# Patient Record
Sex: MALE | Race: OTHER | Employment: UNEMPLOYED | URBAN - METROPOLITAN AREA
[De-identification: names, ages, dates, MRNs, and addresses within clinical notes are randomized per-mention and may not be internally consistent; named-entity substitution may affect disease eponyms.]

---

## 2019-04-19 ENCOUNTER — OFFICE VISIT (OUTPATIENT)
Dept: FAMILY MEDICINE CLINIC | Age: 44
End: 2019-04-19

## 2019-04-19 VITALS
SYSTOLIC BLOOD PRESSURE: 124 MMHG | RESPIRATION RATE: 16 BRPM | DIASTOLIC BLOOD PRESSURE: 84 MMHG | TEMPERATURE: 97.8 F | HEART RATE: 100 BPM | WEIGHT: 263 LBS | OXYGEN SATURATION: 94 % | BODY MASS INDEX: 39.86 KG/M2 | HEIGHT: 68 IN

## 2019-04-19 DIAGNOSIS — Z00.00 PHYSICAL EXAM, ANNUAL: Primary | ICD-10-CM

## 2019-04-19 DIAGNOSIS — I10 ESSENTIAL HYPERTENSION: ICD-10-CM

## 2019-04-19 DIAGNOSIS — S99.921S INJURY OF RIGHT FOOT, SEQUELA: ICD-10-CM

## 2019-04-19 DIAGNOSIS — S89.91XS INJURY OF RIGHT LOWER EXTREMITY, SEQUELA: ICD-10-CM

## 2019-04-19 DIAGNOSIS — G89.21 CHRONIC PAIN DUE TO TRAUMA: ICD-10-CM

## 2019-04-19 DIAGNOSIS — E66.01 SEVERE OBESITY (HCC): ICD-10-CM

## 2019-04-19 RX ORDER — CHLORTHALIDONE 25 MG/1
25 TABLET ORAL DAILY
Qty: 30 TAB | Refills: 2 | Status: SHIPPED | OUTPATIENT
Start: 2019-04-19 | End: 2019-07-11 | Stop reason: SDUPTHER

## 2019-04-19 RX ORDER — DULOXETIN HYDROCHLORIDE 30 MG/1
30 CAPSULE, DELAYED RELEASE ORAL DAILY
Qty: 60 CAP | Refills: 0 | Status: SHIPPED | OUTPATIENT
Start: 2019-04-19 | End: 2019-05-17

## 2019-04-19 RX ORDER — CHLORTHALIDONE 25 MG/1
TABLET ORAL DAILY
COMMUNITY
End: 2019-04-19 | Stop reason: SDUPTHER

## 2019-04-19 NOTE — PROGRESS NOTES
1. Have you been to the ER, urgent care clinic since your last visit? Hospitalized since your last visit? No 
 
2. Have you seen or consulted any other health care providers outside of the [address] [place] [address] since your last visit? Include any pap smears or colon screening. No  
 
Chief Complaint Patient presents with [facility] Establish Care Visit Vitals /84 (BP 1 Location: Left arm, BP Patient Position: At rest) Pulse 100 Temp 97.8 °F (36.6 °C) Resp 16 Ht 5' 8\" (1.727 m) Wt 263 lb (119.3 kg) SpO2 94% BMI 39.99 kg/m²
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
## 2019-05-09 ENCOUNTER — OFFICE VISIT (OUTPATIENT)
Dept: ORTHOPEDIC SURGERY | Facility: CLINIC | Age: 44
End: 2019-05-09

## 2019-05-09 VITALS
SYSTOLIC BLOOD PRESSURE: 122 MMHG | OXYGEN SATURATION: 97 % | TEMPERATURE: 98.5 F | BODY MASS INDEX: 39.5 KG/M2 | HEART RATE: 84 BPM | HEIGHT: 68 IN | WEIGHT: 260.6 LBS | RESPIRATION RATE: 16 BRPM | DIASTOLIC BLOOD PRESSURE: 88 MMHG

## 2019-05-09 DIAGNOSIS — M25.561 CHRONIC PAIN OF RIGHT KNEE: ICD-10-CM

## 2019-05-09 DIAGNOSIS — Q66.52 CONGENITAL PES PLANUS, LEFT FOOT: ICD-10-CM

## 2019-05-09 DIAGNOSIS — G89.29 CHRONIC PAIN OF RIGHT KNEE: ICD-10-CM

## 2019-05-09 DIAGNOSIS — M17.31 POST-TRAUMATIC OSTEOARTHRITIS OF RIGHT KNEE: Primary | ICD-10-CM

## 2019-05-09 RX ORDER — BETAMETHASONE SODIUM PHOSPHATE AND BETAMETHASONE ACETATE 3; 3 MG/ML; MG/ML
6 INJECTION, SUSPENSION INTRA-ARTICULAR; INTRALESIONAL; INTRAMUSCULAR; SOFT TISSUE ONCE
Qty: 0.5 ML | Refills: 0
Start: 2019-05-09 | End: 2019-05-09
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
## 2019-05-09 NOTE — LETTER
5/9/2019 9:01 AM 
 
Mr. Stefania Villalpando [address] [phone] Full work restrictions for injuries to the Thigh, Knee, Leg PATIENT'S NAME: Stefania Villalpando   DATE: 5/9/2019 LIFTING:   The patient can lift no more than 20 pounds. CLIMBING:   The patient can climb no ladders or stairs WALKING/STANDING The patient can walk or stand no more than 1 hour at a time. The patient cannot walk on uneven ground or on scaffolding. KNEELING/SQUATTING The patient cannot kneel or squat These restrictions are in effect for the above named patient From: 5/9/2019  TO:PERMANENT Sincerely, 
 
 
 
Bernie Sahu MD

## 2019-05-15 ENCOUNTER — OFFICE VISIT (OUTPATIENT)
Dept: ORTHOPEDIC SURGERY | Age: 44
End: 2019-05-15

## 2019-05-15 VITALS
RESPIRATION RATE: 16 BRPM | TEMPERATURE: 97.6 F | OXYGEN SATURATION: 97 % | SYSTOLIC BLOOD PRESSURE: 139 MMHG | HEIGHT: 68 IN | HEART RATE: 77 BPM | DIASTOLIC BLOOD PRESSURE: 92 MMHG | WEIGHT: 259 LBS | BODY MASS INDEX: 39.25 KG/M2

## 2019-05-15 DIAGNOSIS — Q66.6 PES PLANOVALGUS: ICD-10-CM

## 2019-05-15 DIAGNOSIS — G89.29 CHRONIC PAIN OF LEFT ANKLE: ICD-10-CM

## 2019-05-15 DIAGNOSIS — M25.572 CHRONIC PAIN OF LEFT ANKLE: ICD-10-CM

## 2019-05-15 DIAGNOSIS — M19.072 PRIMARY OSTEOARTHRITIS OF LEFT FOOT: ICD-10-CM

## 2019-05-15 DIAGNOSIS — M79.672 LEFT FOOT PAIN: Primary | ICD-10-CM

## 2019-05-15 RX ORDER — DICLOFENAC SODIUM 10 MG/G
GEL TOPICAL 4 TIMES DAILY
COMMUNITY
End: 2020-01-27

## 2019-05-15 RX ORDER — DICLOFENAC SODIUM 75 MG/1
75 TABLET, DELAYED RELEASE ORAL 2 TIMES DAILY WITH MEALS
Qty: 60 TAB | Refills: 0 | Status: SHIPPED | OUTPATIENT
Start: 2019-05-15 | End: 2020-01-27

## 2019-05-15 RX ORDER — FAMOTIDINE 40 MG/1
40 TABLET, FILM COATED ORAL DAILY
Qty: 30 TAB | Refills: 0 | Status: SHIPPED | OUTPATIENT
Start: 2019-05-15 | End: 2020-01-27

## 2019-05-15 NOTE — PATIENT INSTRUCTIONS
An MRI or CT has been ordered for you. A [employer] will be contacting you to schedule the appointment as soon as it has been approved with your insurance company. Please schedule an appointment to follow up with the doctor or the physicians assistant after the MRI or CT has been conducted. Flatfoot: Care Instructions Your Care Instructions A flatfoot means that the bottom of your foot does not have the usual arch. One or both of your feet may be flat. You may have inherited flatfeet. Having an injury, being very overweight, or having a condition such as rheumatoid arthritis, stroke, or diabetes also can cause the arch to flatten. Flatfoot usually is not a serious problem. But some people do have pain if they gain weight or stand a lot. You also can have pain when walking or running. You can do exercises and wear pads and roomy shoes to help support your feet. Follow-up care is a key part of your treatment and safety. Be sure to make and go to all appointments, and call your doctor if you are having problems. It's also a good idea to know your test results and keep a list of the medicines you take. How can you care for yourself at home? · Wear shoes with good arch support and lots of room in the toes. · Put heel padding (called a heel cup) or inserts (called orthotics) in your shoes to raise the heel. Orthotics are molded pieces of rubber, leather, or other material that can help cushion and balance your feet. · Try these exercises to stretch your feet and make them stronger, if your doctor says it is okay. ? Stretch your calf muscles: Stand about 1 foot from a wall and place the palms of both hands against the wall at chest level. Step back with one foot. Keep that leg straight at the knee, and keep both feet flat on the floor. Your feet should point at the wall or slightly toward the center of your body.  Bend your front leg at the knee, and press the wall with both hands until you feel a gentle stretch in your back leg. Hold for at least 15 seconds. Increase to 30 seconds over time. Switch legs and repeat. Do this 2 to 4 times a day. ? Stretch your feet: Sit on the floor or a mat with your feet stretched in front of you. Roll up a towel lengthwise and loop it around the ball of one foot. Hold one end of the towel in each hand and gently pull the towel toward your body. Hold for at least 15 to 30 seconds. Repeat with the other foot. Do this 2 to 4 times a day. ? Make your feet stronger: Place a towel on the floor. Sit in a chair in front of the towel with both feet flat on the towel at one end.  the towel with the toes of one foot while keeping the heel of that foot on the floor. (Use your other foot to anchor the towel). Curl your toes to pull the towel toward you. Repeat with the other foot. Do this 2 to 4 times a day. · Take anti-inflammatory medicines such as ibuprofen (Advil, Motrin) and naproxen (Aleve) to reduce pain if your feet or legs hurt. Read and follow all instructions on the label. · Try heat or massage on the area that is causing you pain. Use a heating pad set on low or a warm cloth. Put a thin cloth between the heating pad and your skin. When should you call for help? Watch closely for changes in your health, and be sure to contact your doctor if: 
  · You have pain in your feet or legs.  
  · You want help to find orthotics to fit your feet. Where can you learn more? Go to http://marilin-desirae.info/. Enter P227 in the search box to learn more about \"Flatfoot: Care Instructions. \" Current as of: September 20, 2018 Content Version: 11.9 © 8942-2800 [employer]. Care instructions adapted under license by [employer] (which disclaims liability or warranty for this information).  If you have questions about a medical condition or this instruction, always ask your healthcare professional. [employer] disclaims any warranty or liability for your use of this information.

## 2019-05-15 NOTE — PROGRESS NOTES
1. Have you been to the ER, urgent care clinic since your last visit? Hospitalized since your last visit? NO 
 
2. Have you seen or consulted any other health care providers outside of the [address] [place] [address] since your last visit? Include any pap smears or colon screening. Yes, Dr. Chilo Izquierdo from in [facility]

## 2019-05-15 NOTE — LETTER
NOTIFICATION RETURN TO WORK / SCHOOL 
 
5/15/2019 9:11 AM 
 
Mr. Yulissa Dolan [address] [place] [id number] To Whom It May Concern: 
 
[facility]ul[facility] D[facility] Mr. Hugo Castrejon is under my care for severe pain from symptomatic pes planovalgus foot deformity. He cannot stand, walk, bend, lift, or twist until further notice. He is recommended to work a 100% sedentary position, if available. If unavailable, he needs to remain out of work until further notice. If there are questions or concerns please have the patient contact our office.  
 
 
 
Sincerely, 
 
 
Chino Velasco MD
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
## 2019-05-16 ENCOUNTER — TELEPHONE (OUTPATIENT)
Dept: ORTHOPEDIC SURGERY | Age: 44
End: 2019-05-16

## 2019-05-16 NOTE — TELEPHONE ENCOUNTER
There is a Voltaren tablet, gel capsules, or topical gel but there is no oral suspension available. We can prescribe topical Voltaren gel of the patient cannot swallow the tablet form. The Pepcid was rx as protection for the stomach if patient was going to us the oral Voltaren.

## 2019-05-16 NOTE — TELEPHONE ENCOUNTER
Patient called in states that [facility] does not have his medication from yesterday for the diclofenac EC (VOLTAREN) 75 mg EC tablet   They only have received the rx for famotidine (PEPCID) 40 mg tablet   Patient needs rx resent and he also does not understand why he was given the Pepcid rx.  Please advise patient at [phone]

## 2019-05-16 NOTE — TELEPHONE ENCOUNTER
Patient called in states that both prescriptions were sent over to the pharmacy in a pill form but the doctor told him they were sending both in a liquid form that he swallows. He can be reached at [phone].

## 2019-05-17 ENCOUNTER — OFFICE VISIT (OUTPATIENT)
Dept: FAMILY MEDICINE CLINIC | Age: 44
End: 2019-05-17

## 2019-05-17 VITALS
OXYGEN SATURATION: 98 % | RESPIRATION RATE: 16 BRPM | HEART RATE: 106 BPM | DIASTOLIC BLOOD PRESSURE: 91 MMHG | WEIGHT: 262.2 LBS | TEMPERATURE: 96.9 F | HEIGHT: 68 IN | BODY MASS INDEX: 39.74 KG/M2 | SYSTOLIC BLOOD PRESSURE: 142 MMHG

## 2019-05-17 DIAGNOSIS — M54.50 CHRONIC MIDLINE LOW BACK PAIN WITHOUT SCIATICA: ICD-10-CM

## 2019-05-17 DIAGNOSIS — Q66.6 CONGENITAL PES PLANOVALGUS: ICD-10-CM

## 2019-05-17 DIAGNOSIS — G89.29 CHRONIC MIDLINE LOW BACK PAIN WITHOUT SCIATICA: ICD-10-CM

## 2019-05-17 DIAGNOSIS — S89.91XS INJURY OF RIGHT LOWER EXTREMITY, SEQUELA: ICD-10-CM

## 2019-05-17 DIAGNOSIS — G89.29 CHRONIC LEFT SHOULDER PAIN: ICD-10-CM

## 2019-05-17 DIAGNOSIS — M25.512 CHRONIC LEFT SHOULDER PAIN: ICD-10-CM

## 2019-05-17 DIAGNOSIS — E66.01 SEVERE OBESITY (HCC): Primary | ICD-10-CM

## 2019-05-17 NOTE — PROGRESS NOTES
1. Have you been to the ER, urgent care clinic since your last visit? Hospitalized since your last visit? No    2. Have you seen or consulted any other health care providers outside of the [address] [place] [address] since your last visit? Include any pap smears or colon screening.  No       Chief Complaint   Patient presents with    LOW BACK PAIN    Medication Evaluation     Visit Vitals  BP (!) 142/91 (BP 1 Location: Right arm, BP Patient Position: At rest)   Pulse (!) 106   Temp 96.9 °F (36.1 °C)   Resp 16   Ht 5' 8\" (1.727 m)   Wt 262 lb 3.2 oz (118.9 kg)   SpO2 98%   BMI 39.87 kg/m²

## 2019-05-17 NOTE — PATIENT INSTRUCTIONS
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
## 2019-05-19 PROBLEM — Q66.6 CONGENITAL PES PLANOVALGUS: Status: ACTIVE | Noted: 2019-05-19

## 2019-05-19 PROBLEM — Z98.890 H/O RIGHT KNEE SURGERY: Status: ACTIVE | Noted: 2019-05-19

## 2019-05-19 PROBLEM — I10 HYPERTENSION: Status: ACTIVE | Noted: 2019-05-19
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
## 2019-06-06 ENCOUNTER — OFFICE VISIT (OUTPATIENT)
Dept: ORTHOPEDIC SURGERY | Facility: CLINIC | Age: 44
End: 2019-06-06

## 2019-06-06 VITALS
DIASTOLIC BLOOD PRESSURE: 96 MMHG | OXYGEN SATURATION: 98 % | BODY MASS INDEX: 39.98 KG/M2 | WEIGHT: 263.8 LBS | HEART RATE: 87 BPM | RESPIRATION RATE: 18 BRPM | HEIGHT: 68 IN | SYSTOLIC BLOOD PRESSURE: 134 MMHG | TEMPERATURE: 96.7 F

## 2019-06-06 DIAGNOSIS — M25.512 CHRONIC LEFT SHOULDER PAIN: ICD-10-CM

## 2019-06-06 DIAGNOSIS — M25.561 CHRONIC PAIN OF RIGHT KNEE: ICD-10-CM

## 2019-06-06 DIAGNOSIS — G89.29 CHRONIC PAIN OF RIGHT KNEE: ICD-10-CM

## 2019-06-06 DIAGNOSIS — M17.31 POST-TRAUMATIC OSTEOARTHRITIS OF RIGHT KNEE: Primary | ICD-10-CM

## 2019-06-06 DIAGNOSIS — S43.402A SPRAIN OF LEFT SHOULDER, UNSPECIFIED SHOULDER SPRAIN TYPE, INITIAL ENCOUNTER: ICD-10-CM

## 2019-06-06 DIAGNOSIS — M54.50 LUMBAR PAIN: ICD-10-CM

## 2019-06-06 DIAGNOSIS — G89.29 CHRONIC LEFT SHOULDER PAIN: ICD-10-CM

## 2019-06-06 DIAGNOSIS — M75.52 ACUTE BURSITIS OF LEFT SHOULDER: ICD-10-CM

## 2019-06-06 RX ORDER — BETAMETHASONE SODIUM PHOSPHATE AND BETAMETHASONE ACETATE 3; 3 MG/ML; MG/ML
6 INJECTION, SUSPENSION INTRA-ARTICULAR; INTRALESIONAL; INTRAMUSCULAR; SOFT TISSUE ONCE
Qty: 0.5 ML | Refills: 0
Start: 2019-06-06 | End: 2019-06-06

## 2019-06-06 RX ORDER — HYALURONATE SODIUM 10 MG/ML
2 SYRINGE (ML) INTRAARTICULAR ONCE
Qty: 2 ML | Refills: 0
Start: 2019-06-06 | End: 2019-06-06
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
## 2019-06-07 ENCOUNTER — DOCUMENTATION ONLY (OUTPATIENT)
Dept: ORTHOPEDIC SURGERY | Facility: CLINIC | Age: 44
End: 2019-06-07

## 2019-06-07 NOTE — PROGRESS NOTES
Patient dropped off Physician's Certificate of Health to be competed . Patient can be reached at [phone] when ready for pick-up.

## 2019-06-12 ENCOUNTER — APPOINTMENT (OUTPATIENT)
Dept: CT IMAGING | Age: 44
End: 2019-06-12
Attending: ORTHOPAEDIC SURGERY
Payer: MEDICAID

## 2019-06-12 ENCOUNTER — HOSPITAL ENCOUNTER (OUTPATIENT)
Dept: CT IMAGING | Age: 44
Discharge: HOME | End: 2019-06-12
Attending: ORTHOPAEDIC SURGERY
Payer: MEDICAID

## 2019-06-12 DIAGNOSIS — M79.672 LEFT FOOT PAIN: ICD-10-CM

## 2019-06-12 PROCEDURE — 73700 CT LOWER EXTREMITY W/O DYE: CPT

## 2019-06-13 ENCOUNTER — OFFICE VISIT (OUTPATIENT)
Dept: ORTHOPEDIC SURGERY | Facility: CLINIC | Age: 44
End: 2019-06-13

## 2019-06-13 ENCOUNTER — DOCUMENTATION ONLY (OUTPATIENT)
Dept: ORTHOPEDIC SURGERY | Facility: CLINIC | Age: 44
End: 2019-06-13

## 2019-06-13 VITALS
HEART RATE: 96 BPM | TEMPERATURE: 96.3 F | RESPIRATION RATE: 19 BRPM | WEIGHT: 260.6 LBS | BODY MASS INDEX: 39.5 KG/M2 | HEIGHT: 68 IN | SYSTOLIC BLOOD PRESSURE: 135 MMHG | OXYGEN SATURATION: 94 % | DIASTOLIC BLOOD PRESSURE: 94 MMHG

## 2019-06-13 DIAGNOSIS — M17.31 POST-TRAUMATIC OSTEOARTHRITIS OF RIGHT KNEE: Primary | ICD-10-CM

## 2019-06-13 DIAGNOSIS — M25.561 CHRONIC PAIN OF RIGHT KNEE: ICD-10-CM

## 2019-06-13 DIAGNOSIS — G89.29 CHRONIC PAIN OF RIGHT KNEE: ICD-10-CM

## 2019-06-13 RX ORDER — HYALURONATE SODIUM 10 MG/ML
2 SYRINGE (ML) INTRAARTICULAR ONCE
Qty: 2 ML | Refills: 0
Start: 2019-06-13 | End: 2019-06-13

## 2019-06-13 NOTE — PROGRESS NOTES
Patient: Margot Henderson                MRN: [id number]       SSN: [id number]  YOB: 1975        AGE: 53 y.o. SEX: male  Body mass index is 39.62 kg/m². PCP: Hemant Gaytan NP  06/13/19    Chief Complaint   Patient presents with    Knee Pain     right     HISTORY:  Margot Henderson is a 53 y.o. male who is seen for right knee pain. ICD-10-CM ICD-9-CM    1. Post-traumatic osteoarthritis of right knee M17.31 715.26 LA DRAIN/INJECT LARGE JOINT/BURSA      EUFLEXXA INJECTION PER DOSE      sodium hyaluronate (SUPARTZ FX/HYALGAN/GENIVSC) 10 mg/mL syrg injection   2. Chronic pain of right knee M25.561 719.46 LA DRAIN/INJECT LARGE JOINT/BURSA    G89.29 338.29 EUFLEXXA INJECTION PER DOSE      sodium hyaluronate (SUPARTZ FX/HYALGAN/GENIVSC) 10 mg/mL syrg injection     PROCEDURE:  After discussing treatment options, Mr. Troncoso's right knee was injected with 2 cc of Euflexxa. Chart reviewed for the following:   Audrey Resendiz MD, have reviewed the History, Physical and updated the Allergic reactions for [address] [place] [address] Drive performed immediately prior to start of procedure:  Audrey Resendiz MD, have performed the following reviews on Margot Henderson prior to the start of the procedure:            * Patient was identified by name and date of birth   * Agreement on procedure being performed was verified  * Risks and Benefits explained to the patient  * Procedure site verified and marked as necessary  * Patient was positioned for comfort  * Consent was obtained     Time: 8:40 AM     Date of procedure: 6/13/2019    Procedure performed by:  Walt Ortega MD    Mr. Troncoso tolerated the procedure well with no complications. PLAN:  After discussing treatment options, patient's right knee was injected with 2 cc of Euflexxa. Mr. Shashank Ko will follow up in one week to continue his visco supplementation injection series.       Scribed by Cassius Barber ([address]) as dictated by Jerson Childers MD

## 2019-06-13 NOTE — PROGRESS NOTES
1. Have you been to the ER, urgent care clinic since your last visit? Hospitalized since your last visit? No    2. Have you seen or consulted any other health care providers outside of the [address] [place] [address] since your last visit? Include any pap smears or colon screening.  No

## 2019-06-21 ENCOUNTER — OFFICE VISIT (OUTPATIENT)
Dept: ORTHOPEDIC SURGERY | Facility: CLINIC | Age: 44
End: 2019-06-21

## 2019-06-21 VITALS
DIASTOLIC BLOOD PRESSURE: 97 MMHG | TEMPERATURE: 97.6 F | BODY MASS INDEX: 40.13 KG/M2 | HEART RATE: 91 BPM | RESPIRATION RATE: 18 BRPM | WEIGHT: 264.8 LBS | SYSTOLIC BLOOD PRESSURE: 137 MMHG | OXYGEN SATURATION: 97 % | HEIGHT: 68 IN

## 2019-06-21 DIAGNOSIS — G89.29 CHRONIC PAIN OF RIGHT KNEE: ICD-10-CM

## 2019-06-21 DIAGNOSIS — M25.561 CHRONIC PAIN OF RIGHT KNEE: ICD-10-CM

## 2019-06-21 DIAGNOSIS — M17.31 POST-TRAUMATIC OSTEOARTHRITIS OF RIGHT KNEE: Primary | ICD-10-CM

## 2019-06-21 RX ORDER — HYALURONATE SODIUM 10 MG/ML
2 SYRINGE (ML) INTRAARTICULAR ONCE
Qty: 2 ML | Refills: 0
Start: 2019-06-21 | End: 2019-06-21

## 2019-06-21 NOTE — PROGRESS NOTES
Patient: Gera Alvarez                MRN: [id number]       SSN: [id number]  YOB: 1975        AGE: 53 y.o. SEX: male  Body mass index is 40.26 kg/m². PCP: Marquita Jeans, NP  06/21/19    Chief Complaint   Patient presents with    Knee Pain     Right     HISTORY:  Chantell Baum III is a 53 y.o. male who is seen for right knee pain. PROCEDURE:  After discussing treatment options, Mr. Troncoso's right knee was injected with 2 cc of Euflexxa. TIME OUT performed immediately prior to start of procedure:  Imani Pérez MD, have performed the following reviews on Chantell Baum III prior to the start of the procedure:            * Patient was identified by name and date of birth   * Agreement on procedure being performed was verified  * Risks and Benefits explained to the patient  * Procedure site verified and marked as necessary  * Patient was positioned for comfort  * Consent was obtained     Time: 11:05 AM     Date of procedure: 6/21/2019    Procedure performed by:  Emely Mckee MD    Mr. Troncoso tolerated the procedure well with no complications      [id number] ICD-9-CM    1. Post-traumatic osteoarthritis of right knee M17.31 715.26 SC DRAIN/INJECT LARGE JOINT/BURSA      EUFLEXXA INJECTION PER DOSE      sodium hyaluronate (SUPARTZ FX/HYALGAN/GENIVSC) 10 mg/mL syrg injection   2. Chronic pain of right knee M25.561 719.46 SC DRAIN/INJECT LARGE JOINT/BURSA    G89.29 338.29 EUFLEXXA INJECTION PER DOSE      sodium hyaluronate (SUPARTZ FX/HYALGAN/GENIVSC) 10 mg/mL syrg injection     PLAN:  After discussing treatment options, patient's right knee was injected with 2 cc of Euflexxa. Mr. Nadiya Cortez will follow up PRN now that he has completed his visco supplementation injection series.       Scribed by Brannon Bowman ([address]) as dictated by Emely Mckee

## 2019-06-26 ENCOUNTER — TELEPHONE (OUTPATIENT)
Dept: ORTHOPEDIC SURGERY | Age: 44
End: 2019-06-26

## 2019-06-26 NOTE — TELEPHONE ENCOUNTER
Patient was call to get result of his CT scan that was done on 6/12/19. He was not aware he needed a F/U appointment. Does he need to be seen today or wait until next week?     Please advise

## 2019-07-02 ENCOUNTER — OFFICE VISIT (OUTPATIENT)
Dept: FAMILY MEDICINE CLINIC | Age: 44
End: 2019-07-02

## 2019-07-02 VITALS
BODY MASS INDEX: 40.01 KG/M2 | SYSTOLIC BLOOD PRESSURE: 135 MMHG | WEIGHT: 264 LBS | HEIGHT: 68 IN | DIASTOLIC BLOOD PRESSURE: 93 MMHG | HEART RATE: 80 BPM | RESPIRATION RATE: 16 BRPM | TEMPERATURE: 97.8 F

## 2019-07-02 DIAGNOSIS — F10.10 ALCOHOL ABUSE: ICD-10-CM

## 2019-07-02 DIAGNOSIS — F34.1 DYSTHYMIA: ICD-10-CM

## 2019-07-02 DIAGNOSIS — I10 ESSENTIAL HYPERTENSION: Primary | ICD-10-CM

## 2019-07-02 DIAGNOSIS — E66.01 SEVERE OBESITY (HCC): ICD-10-CM

## 2019-07-02 RX ORDER — AMLODIPINE BESYLATE 5 MG/1
5 TABLET ORAL DAILY
Qty: 30 TAB | Refills: 0 | Status: SHIPPED | OUTPATIENT
Start: 2019-07-02 | End: 2019-07-11 | Stop reason: SDUPTHER
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
## 2019-07-02 NOTE — PROGRESS NOTES
1. Have you been to the ER, urgent care clinic since your last visit? Hospitalized since your last visit? No.     2. Have you seen or consulted any other health care providers outside of the [address] [place] [address] since your last visit? Include any pap smears or colon screening. Yes, saw his Orthopaedic in 6/2019 for injection of right knee and low back. Chief Complaint   Patient presents with    LOW BACK PAIN     1 month follow up.  Hypertension     Yesterday his BP was 148/120.

## 2019-07-02 NOTE — PATIENT INSTRUCTIONS
Call insurance about nutrition classes, can go to [employer] for classes with pay out of pocket     Learning About Low-Carbohydrate Diets for Weight Loss  What is a low-carbohydrate diet? Low-carb diets avoid foods that are high in carbohydrate. These high-carb foods include pasta, bread, rice, cereal, fruits, and starchy vegetables. Instead, these diets usually have you eat foods that are high in fat and protein. Many people lose weight quickly on a low-carb diet. But the early weight loss is water. People on this diet often gain the weight back after they start eating carbs again. Not all diet plans are safe or work well. A lot of the evidence shows that low-carb diets aren't healthy. That's because these diets often don't include healthy foods like fruits and vegetables. Losing weight safely means balancing protein, fat, and carbs with every meal and snack. And low-carb diets don't always provide the vitamins, minerals, and fiber you need. If you have a serious medical condition, talk to your doctor before you try any diet. These conditions include kidney disease, heart disease, type 2 diabetes, high cholesterol, and high blood pressure. If you are pregnant, it may not be safe for your baby if you are on a low-carb diet. How can you lose weight safely? You might have heard that a diet plan helped another person lose weight. But that doesn't mean that it will work for you. It is very hard to stay on a diet that includes lots of big changes in your eating habits. If you want to get to a healthy weight and stay there, making healthy lifestyle changes will often work better than dieting. These steps can help. · Make a plan for change. Work with your doctor to create a plan that is right for you. · See a dietitian. He or she can show you how to make healthy changes in your eating habits. · Manage stress.  If you have a lot of stress in your life, it can be hard to focus on making healthy changes to your daily habits. · Track your food and activity. You are likely to do better at losing weight if you keep track of what you eat and what you do. Follow-up care is a key part of your treatment and safety. Be sure to make and go to all appointments, and call your doctor if you are having problems. It's also a good idea to know your test results and keep a list of the medicines you take. Where can you learn more? Go to http://marilin-desirae.info/. Enter A121 in the search box to learn more about \"Learning About Low-Carbohydrate Diets for Weight Loss. \"  Current as of: March 28, 2018  Content Version: 11.9  © 6554-2563 [employer]. Care instructions adapted under license by "[employer] (which disclaims liability or warranty for this information). If you have questions about a medical condition or this instruction, always ask your healthcare professional. [address] any warranty or liability for your use of this information.

## 2019-07-03 ENCOUNTER — OFFICE VISIT (OUTPATIENT)
Dept: ORTHOPEDIC SURGERY | Age: 44
End: 2019-07-03

## 2019-07-03 VITALS
TEMPERATURE: 97.3 F | RESPIRATION RATE: 24 BRPM | SYSTOLIC BLOOD PRESSURE: 148 MMHG | HEART RATE: 86 BPM | DIASTOLIC BLOOD PRESSURE: 100 MMHG | BODY MASS INDEX: 39.86 KG/M2 | HEIGHT: 68 IN | OXYGEN SATURATION: 97 % | WEIGHT: 263 LBS

## 2019-07-03 DIAGNOSIS — M54.50 LOW BACK PAIN AT MULTIPLE SITES: Primary | ICD-10-CM

## 2019-07-03 DIAGNOSIS — M47.817 LUMBOSACRAL SPONDYLOSIS WITHOUT MYELOPATHY: ICD-10-CM

## 2019-07-03 DIAGNOSIS — M51.36 DDD (DEGENERATIVE DISC DISEASE), LUMBAR: ICD-10-CM

## 2019-07-03 LAB
ALB/GLOBRATIO, 58C: 1.5 (CALC) (ref 1–2.5)
ALBUMIN SERPL-MCNC: 4.4 G/DL (ref 3.6–5.1)
ALP SERPL-CCNC: 63 U/L (ref 40–115)
ALT SERPL-CCNC: 21 U/L (ref 9–46)
AST SERPL W P-5'-P-CCNC: 16 U/L (ref 10–40)
BILIRUB SERPL-MCNC: 0.5 MG/DL (ref 0.2–1.2)
BUN SERPL-MCNC: 13 MG/DL (ref 7–25)
BUN/CREATININE RATIO,BUCR: ABNORMAL (CALC) (ref 6–22)
CALCIUM SERPL-MCNC: 9.7 MG/DL (ref 8.6–10.3)
CHLORIDE SERPL-SCNC: 102 MMOL/L (ref 98–110)
CHOL/HDL RATIO,CHHDX: 2.6 (CALC)
CHOLEST SERPL-MCNC: 148 MG/DL
CO2 SERPL-SCNC: 24 MMOL/L (ref 20–32)
CREAT SERPL-MCNC: 1.05 MG/DL (ref 0.6–1.35)
ERYTHROCYTE [DISTWIDTH] IN BLOOD BY AUTOMATED COUNT: 14 % (ref 11–15)
GLOBULIN,GLOB: 2.9 G/DL (CALC) (ref 1.9–3.7)
GLUCOSE SERPL-MCNC: 102 MG/DL (ref 65–99)
HCT VFR BLD AUTO: 43.1 % (ref 38.5–50)
HDLC SERPL-MCNC: 57 MG/DL
HGB BLD-MCNC: 14 G/DL (ref 13.2–17.1)
LDL-CHOLESTEROL: 76 MG/DL (CALC)
MCH RBC QN AUTO: 26.5 PG (ref 27–33)
MCHC RBC AUTO-ENTMCNC: 32.5 G/DL (ref 32–36)
MCV RBC AUTO: 81.5 FL (ref 80–100)
NON-HDL CHOLESTEROL, 011976: 91 MG/DL (CALC)
PLATELET # BLD AUTO: 337 THOUSAND/UL (ref 140–400)
PMV BLD AUTO: 8.6 FL (ref 7.5–12.5)
POTASSIUM SERPL-SCNC: 4.2 MMOL/L (ref 3.5–5.3)
PROT SERPL-MCNC: 7.3 G/DL (ref 6.1–8.1)
RBC # BLD AUTO: 5.29 MILLION/UL (ref 4.2–5.8)
SODIUM SERPL-SCNC: 139 MMOL/L (ref 135–146)
TRIGL SERPL-MCNC: 73 MG/DL (ref ?–150)
WBC # BLD AUTO: 6.1 THOUSAND/UL (ref 3.8–10.8)
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
## 2019-07-03 NOTE — LETTER
7/3/19 Patient: Danielle Louis YOB: 1975 Date of Visit: 7/3/2019 Ana Cantor NP 
[address]  [address] [place] [address] VIA In Basket Bryce Puentes MD 
[id number] [facility] 
[address] [facility] [address] VIA In Basket Dear NAMRATA Ruano MD, Thank you for referring Mr. Luisana Rivera to [address] [place] [address] for evaluation. My notes for this consultation are attached. If you have questions, please do not hesitate to call me. I look forward to following your patient along with you. Sincerely, Cielo Noriega MD

## 2019-07-05 PROBLEM — M79.672 BILATERAL FOOT PAIN: Status: ACTIVE | Noted: 2019-07-05

## 2019-07-05 PROBLEM — F10.10 ALCOHOL ABUSE: Status: ACTIVE | Noted: 2019-07-05

## 2019-07-05 PROBLEM — M79.671 BILATERAL FOOT PAIN: Status: ACTIVE | Noted: 2019-07-05

## 2019-07-09 ENCOUNTER — OFFICE VISIT (OUTPATIENT)
Dept: FAMILY MEDICINE CLINIC | Age: 44
End: 2019-07-09

## 2019-07-09 VITALS
RESPIRATION RATE: 18 BRPM | HEIGHT: 68 IN | BODY MASS INDEX: 39.71 KG/M2 | SYSTOLIC BLOOD PRESSURE: 123 MMHG | WEIGHT: 262 LBS | TEMPERATURE: 96.5 F | DIASTOLIC BLOOD PRESSURE: 82 MMHG | HEART RATE: 102 BPM | OXYGEN SATURATION: 96 %

## 2019-07-09 DIAGNOSIS — R42 DIZZINESS: ICD-10-CM

## 2019-07-09 DIAGNOSIS — F10.10 ALCOHOL ABUSE: ICD-10-CM

## 2019-07-09 DIAGNOSIS — I10 ESSENTIAL HYPERTENSION: Primary | ICD-10-CM

## 2019-07-09 NOTE — PATIENT INSTRUCTIONS
Learn the names of your medications and dosages  Continue to take amlodipine 5mg every morning  Change chlorthalidone to 1/2 pill or 12.5mg  Check blood pressure every morning before meds and again 2-3 hours after  Include heart rate  If you notice any changes in the way you feel check blood pressure and heart and document
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
## 2019-07-09 NOTE — PROGRESS NOTES
Chief Complaint   Patient presents with    Blood Pressure Check     1. Have you been to the ER, urgent care clinic since your last visit? Hospitalized since your last visit? No    2. Have you seen or consulted any other health care providers outside of the [address] [place] [address] since your last visit? Include any pap smears or colon screening.  No

## 2019-07-12 ENCOUNTER — OFFICE VISIT (OUTPATIENT)
Dept: FAMILY MEDICINE CLINIC | Age: 44
End: 2019-07-12

## 2019-07-12 ENCOUNTER — CLINICAL SUPPORT (OUTPATIENT)
Dept: FAMILY MEDICINE CLINIC | Age: 44
End: 2019-07-12

## 2019-07-12 VITALS
HEART RATE: 93 BPM | SYSTOLIC BLOOD PRESSURE: 136 MMHG | HEIGHT: 68 IN | DIASTOLIC BLOOD PRESSURE: 97 MMHG | OXYGEN SATURATION: 95 % | TEMPERATURE: 97.9 F | RESPIRATION RATE: 18 BRPM | BODY MASS INDEX: 39.86 KG/M2 | WEIGHT: 263 LBS

## 2019-07-12 VITALS
RESPIRATION RATE: 18 BRPM | HEART RATE: 93 BPM | SYSTOLIC BLOOD PRESSURE: 136 MMHG | BODY MASS INDEX: 39.86 KG/M2 | DIASTOLIC BLOOD PRESSURE: 97 MMHG | OXYGEN SATURATION: 95 % | WEIGHT: 263 LBS | HEIGHT: 68 IN | TEMPERATURE: 97.9 F

## 2019-07-12 DIAGNOSIS — I10 ESSENTIAL HYPERTENSION: Primary | ICD-10-CM

## 2019-07-12 NOTE — PROGRESS NOTES
1. Have you been to the ER, urgent care clinic since your last visit? Hospitalized since your last visit? No    2. Have you seen or consulted any other health care providers outside of the [address] [place] [address] since your last visit? Include any pap smears or colon screening.  No        Chief Complaint   Patient presents with    Blood Pressure Check

## 2019-07-15 ENCOUNTER — DOCUMENTATION ONLY (OUTPATIENT)
Dept: ORTHOPEDIC SURGERY | Facility: CLINIC | Age: 44
End: 2019-07-15

## 2019-07-15 NOTE — PROGRESS NOTES
Patient called to advise that he's going in for a \"30 day rehab\" starting tomorrow and will not be available to schedule surgery with Dr. Sanjana Vaughan or Dr. Fady James until after that - end of August.

## 2019-08-26 DIAGNOSIS — I10 ESSENTIAL HYPERTENSION: ICD-10-CM

## 2019-08-26 RX ORDER — CHLORTHALIDONE 25 MG/1
TABLET ORAL
Qty: 30 TAB | Refills: 2 | Status: SHIPPED | OUTPATIENT
Start: 2019-08-26 | End: 2019-12-23

## 2019-08-27 ENCOUNTER — OFFICE VISIT (OUTPATIENT)
Dept: ORTHOPEDIC SURGERY | Facility: CLINIC | Age: 44
End: 2019-08-27

## 2019-08-27 VITALS
RESPIRATION RATE: 16 BRPM | HEIGHT: 68 IN | SYSTOLIC BLOOD PRESSURE: 135 MMHG | DIASTOLIC BLOOD PRESSURE: 89 MMHG | TEMPERATURE: 97.4 F | BODY MASS INDEX: 41.52 KG/M2 | WEIGHT: 274 LBS | HEART RATE: 96 BPM | OXYGEN SATURATION: 96 %

## 2019-08-27 DIAGNOSIS — G89.29 CHRONIC PAIN OF RIGHT KNEE: ICD-10-CM

## 2019-08-27 DIAGNOSIS — M17.11 PRIMARY OSTEOARTHRITIS OF RIGHT KNEE: Primary | ICD-10-CM

## 2019-08-27 DIAGNOSIS — M25.561 CHRONIC PAIN OF RIGHT KNEE: ICD-10-CM

## 2019-08-27 DIAGNOSIS — I73.9 VASCULAR CLAUDICATION (HCC): ICD-10-CM

## 2019-08-27 DIAGNOSIS — M79.604 PAIN OF RIGHT LOWER EXTREMITY: ICD-10-CM

## 2019-08-27 RX ORDER — BETAMETHASONE SODIUM PHOSPHATE AND BETAMETHASONE ACETATE 3; 3 MG/ML; MG/ML
6 INJECTION, SUSPENSION INTRA-ARTICULAR; INTRALESIONAL; INTRAMUSCULAR; SOFT TISSUE ONCE
Qty: 0.5 ML | Refills: 0
Start: 2019-08-27 | End: 2019-08-27
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
## 2019-08-28 ENCOUNTER — OFFICE VISIT (OUTPATIENT)
Dept: ORTHOPEDIC SURGERY | Age: 44
End: 2019-08-28

## 2019-08-28 VITALS
WEIGHT: 273.2 LBS | OXYGEN SATURATION: 94 % | BODY MASS INDEX: 41.4 KG/M2 | RESPIRATION RATE: 16 BRPM | SYSTOLIC BLOOD PRESSURE: 133 MMHG | TEMPERATURE: 97.9 F | DIASTOLIC BLOOD PRESSURE: 83 MMHG | HEIGHT: 68 IN | HEART RATE: 87 BPM

## 2019-08-28 DIAGNOSIS — M19.072 PRIMARY OSTEOARTHRITIS OF LEFT FOOT: Primary | ICD-10-CM

## 2019-08-28 DIAGNOSIS — Q66.6 PES PLANOVALGUS: ICD-10-CM

## 2019-08-28 NOTE — PROGRESS NOTES
1. Have you been to the ER, urgent care clinic since your last visit? Hospitalized since your last visit? No    2. Have you seen or consulted any other health care providers outside of the [address] [place] [address] since your last visit? Include any pap smears or colon screening.  No

## 2019-08-28 NOTE — LETTER
8/28/2019 12:17 PM    Mr. Doug Resendez [phone]      To Whom It May Concern:    Daina Baum III is currently under the care of [address] [place] [address] Clyde Roque. Mr. Lillie Pascual has severe osteoarthritis of the left hindfoot and ankle and of the right knee. His treatment options include custom bracing, such as a custom [place] or fixed AFO brace, which he has already tried, or a hindfoot fusion for his left hindfoot. This will leave him with permanently restricted motion of his left hindfoot, which will limit his ability to work. He is a candidate for a right total knee replacement due to severe osteoarthritis. It is my opinion that he is disabled from working his current job. If there are questions or concerns please have the patient contact our office.         Sincerely,      Kayleigh Degroot MD
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
## 2019-09-05 ENCOUNTER — OFFICE VISIT (OUTPATIENT)
Dept: FAMILY MEDICINE CLINIC | Age: 44
End: 2019-09-05

## 2019-09-05 VITALS
SYSTOLIC BLOOD PRESSURE: 130 MMHG | DIASTOLIC BLOOD PRESSURE: 68 MMHG | RESPIRATION RATE: 18 BRPM | TEMPERATURE: 98.1 F | BODY MASS INDEX: 41.07 KG/M2 | OXYGEN SATURATION: 96 % | HEART RATE: 107 BPM | WEIGHT: 271 LBS | HEIGHT: 68 IN

## 2019-09-05 DIAGNOSIS — F33.1 MODERATE EPISODE OF RECURRENT MAJOR DEPRESSIVE DISORDER (HCC): ICD-10-CM

## 2019-09-05 DIAGNOSIS — N52.2 DRUG-INDUCED ERECTILE DYSFUNCTION: ICD-10-CM

## 2019-09-05 DIAGNOSIS — F10.10 ALCOHOL ABUSE: ICD-10-CM

## 2019-09-05 DIAGNOSIS — I10 ESSENTIAL HYPERTENSION: Primary | ICD-10-CM

## 2019-09-05 RX ORDER — RISPERIDONE 1 MG/1
TABLET, FILM COATED ORAL 2 TIMES DAILY
COMMUNITY

## 2019-09-05 RX ORDER — MELOXICAM 15 MG/1
15 TABLET ORAL DAILY
COMMUNITY
End: 2020-01-27

## 2019-09-05 RX ORDER — AMLODIPINE BESYLATE 5 MG/1
5 TABLET ORAL DAILY
Qty: 90 TAB | Refills: 1 | Status: SHIPPED | OUTPATIENT
Start: 2019-09-05 | End: 2021-01-14 | Stop reason: SDUPTHER

## 2019-09-05 RX ORDER — ETODOLAC 300 MG/1
300 CAPSULE ORAL 2 TIMES DAILY
COMMUNITY
End: 2020-01-27
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
## 2019-09-05 NOTE — PATIENT INSTRUCTIONS
DASH Diet: Care Instructions  Your Care Instructions    The DASH diet is an eating plan that can help lower your blood pressure. DASH stands for Dietary Approaches to Stop Hypertension. Hypertension is high blood pressure. The DASH diet focuses on eating foods that are high in calcium, potassium, and magnesium. These nutrients can lower blood pressure. The foods that are highest in these nutrients are fruits, vegetables, low-fat dairy products, nuts, seeds, and legumes. But taking calcium, potassium, and magnesium supplements instead of eating foods that are high in those nutrients does not have the same effect. The DASH diet also includes whole grains, fish, and poultry. The DASH diet is one of several lifestyle changes your doctor may recommend to lower your high blood pressure. Your doctor may also want you to decrease the amount of sodium in your diet. Lowering sodium while following the DASH diet can lower blood pressure even further than just the DASH diet alone. Follow-up care is a key part of your treatment and safety. Be sure to make and go to all appointments, and call your doctor if you are having problems. It's also a good idea to know your test results and keep a list of the medicines you take. How can you care for yourself at home? Following the DASH diet  · Eat 4 to 5 servings of fruit each day. A serving is 1 medium-sized piece of fruit, ½ cup chopped or canned fruit, 1/4 cup dried fruit, or 4 ounces (½ cup) of fruit juice. Choose fruit more often than fruit juice. · Eat 4 to 5 servings of vegetables each day. A serving is 1 cup of lettuce or raw leafy vegetables, ½ cup of chopped or cooked vegetables, or 4 ounces (½ cup) of vegetable juice. Choose vegetables more often than vegetable juice. · Get 2 to 3 servings of low-fat and fat-free dairy each day. A serving is 8 ounces of milk, 1 cup of yogurt, or 1 ½ ounces of cheese. · Eat 6 to 8 servings of grains each day.  A serving is 1 slice of bread, 1 ounce of dry cereal, or ½ cup of cooked rice, pasta, or cooked cereal. Try to choose whole-grain products as much as possible. · Limit lean meat, poultry, and fish to 2 servings each day. A serving is 3 ounces, about the size of a deck of cards. · Eat 4 to 5 servings of nuts, seeds, and legumes (cooked dried beans, lentils, and split peas) each week. A serving is 1/3 cup of nuts, 2 tablespoons of seeds, or ½ cup of cooked beans or peas. · Limit fats and oils to 2 to 3 servings each day. A serving is 1 teaspoon of vegetable oil or 2 tablespoons of salad dressing. · Limit sweets and added sugars to 5 servings or less a week. A serving is 1 tablespoon jelly or jam, ½ cup sorbet, or 1 cup of lemonade. · Eat less than 2,300 milligrams (mg) of sodium a day. If you limit your sodium to 1,500 mg a day, you can lower your blood pressure even more. Tips for success  · Start small. Do not try to make dramatic changes to your diet all at once. You might feel that you are missing out on your favorite foods and then be more likely to not follow the plan. Make small changes, and stick with them. Once those changes become habit, add a few more changes. · Try some of the following:  ? Make it a goal to eat a fruit or vegetable at every meal and at snacks. This will make it easy to get the recommended amount of fruits and vegetables each day. ? Try yogurt topped with fruit and nuts for a snack or healthy dessert. ? Add lettuce, tomato, cucumber, and onion to sandwiches. ? Combine a ready-made pizza crust with low-fat mozzarella cheese and lots of vegetable toppings. Try using tomatoes, squash, spinach, broccoli, carrots, cauliflower, and onions. ? Have a variety of cut-up vegetables with a low-fat dip as an appetizer instead of chips and dip. ? Sprinkle sunflower seeds or chopped almonds over salads. Or try adding chopped walnuts or almonds to cooked vegetables.   ? Try some vegetarian meals using beans and peas. Add garbanzo or kidney beans to salads. Make burritos and tacos with mashed thornton beans or black beans. Where can you learn more? Go to http://marilin-desirae.info/. Enter H610 in the search box to learn more about \"DASH Diet: Care Instructions. \"  Current as of: July 22, 2018  Content Version: 12.1  © 5548-1090 [employer], [employer]. Care instructions adapted under license by [employer] (which disclaims liability or warranty for this information). If you have questions about a medical condition or this instruction, always ask your healthcare professional. [address] any warranty or liability for your use of this information.

## 2019-09-08 PROBLEM — N52.2 DRUG-INDUCED ERECTILE DYSFUNCTION: Status: ACTIVE | Noted: 2019-09-08

## 2019-09-08 PROBLEM — F33.1 MODERATE EPISODE OF RECURRENT MAJOR DEPRESSIVE DISORDER (HCC): Status: ACTIVE | Noted: 2019-09-08

## 2019-09-11 ENCOUNTER — OFFICE VISIT (OUTPATIENT)
Dept: CARDIOLOGY CLINIC | Age: 44
End: 2019-09-11

## 2019-09-11 VITALS
WEIGHT: 278 LBS | RESPIRATION RATE: 22 BRPM | SYSTOLIC BLOOD PRESSURE: 122 MMHG | HEIGHT: 68 IN | HEART RATE: 103 BPM | OXYGEN SATURATION: 97 % | DIASTOLIC BLOOD PRESSURE: 80 MMHG | BODY MASS INDEX: 42.13 KG/M2

## 2019-09-11 DIAGNOSIS — M79.89 LEG SWELLING: Primary | ICD-10-CM

## 2019-09-11 DIAGNOSIS — M79.604 PAIN OF RIGHT LOWER EXTREMITY: ICD-10-CM

## 2019-09-11 NOTE — PROGRESS NOTES
Patient presents in office today as new patient. 3 most recent PHQ Screens 9/11/2019   PHQ Not Done -   Little interest or pleasure in doing things Nearly every day   Feeling down, depressed, irritable, or hopeless Nearly every day   Total Score PHQ 2 6   Trouble falling or staying asleep, or sleeping too much Nearly every day   Feeling tired or having little energy Nearly every day   Poor appetite, weight loss, or overeating Nearly every day   Feeling bad about yourself - or that you are a failure or have let yourself or your family down Nearly every day   Trouble concentrating on things such as school, work, reading, or watching TV Nearly every day   Moving or speaking so slowly that other people could have noticed; or the opposite being so fidgety that others notice Not at all   Thoughts of being better off dead, or hurting yourself in some way Not at all   PHQ 9 Score 21   How difficult have these problems made it for you to do your work, take care of your home and get along with others Very difficult     Provider updated regarding depression score. Patient being followed closely by pcp and psychiatry for depressive issues; has upcoming appt's on 09/13/19 and 09/16/19; patient encouraged to keep both appt's.

## 2019-09-11 NOTE — PROGRESS NOTES
Lymphedema Leg Measurements are as Followed    Left Ankle 27cm  Right Ankle 30.5cm    Left Calf 43cm   Right Calf 48.5cm    Left Knee 44.5cm  Right Knee 47.5cm    Left Thigh 59cm  Right Thigh 61cm
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
## 2019-10-14 ENCOUNTER — OFFICE VISIT (OUTPATIENT)
Dept: ORTHOPEDIC SURGERY | Age: 44
End: 2019-10-14

## 2019-10-14 VITALS
BODY MASS INDEX: 41.95 KG/M2 | WEIGHT: 276.8 LBS | DIASTOLIC BLOOD PRESSURE: 89 MMHG | HEART RATE: 95 BPM | SYSTOLIC BLOOD PRESSURE: 137 MMHG | OXYGEN SATURATION: 92 % | RESPIRATION RATE: 18 BRPM | HEIGHT: 68 IN | TEMPERATURE: 96.2 F

## 2019-10-14 DIAGNOSIS — Q66.6 PES PLANOVALGUS: ICD-10-CM

## 2019-10-14 DIAGNOSIS — M19.172 POST-TRAUMATIC OSTEOARTHRITIS OF LEFT FOOT: Primary | ICD-10-CM

## 2019-10-14 RX ORDER — TRIAMCINOLONE ACETONIDE 40 MG/ML
40 INJECTION, SUSPENSION INTRA-ARTICULAR; INTRAMUSCULAR ONCE
Qty: 1 ML | Refills: 0
Start: 2019-10-14 | End: 2019-10-14
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
## 2019-10-14 NOTE — PROGRESS NOTES
1. Have you been to the ER, urgent care clinic since your last visit? Hospitalized since your last visit? No    2. Have you seen or consulted any other health care providers outside of the [address] [place] [address] since your last visit? Include any pap smears or colon screening.  No

## 2019-11-04 ENCOUNTER — TELEPHONE (OUTPATIENT)
Dept: ORTHOPEDIC SURGERY | Age: 44
End: 2019-11-04

## 2019-11-04 NOTE — TELEPHONE ENCOUNTER
Patient called stating that he saw the pain management doctor last week and it was not a good experience. He stated he was put on a medication that is not helping his pain. He was wondering if he can be referred to a different pain management doctor.  Please advise patient at [phone]

## 2019-11-04 NOTE — TELEPHONE ENCOUNTER
Can we see if there is a different PM doctor available to accept the patient.     Sharonda Gil PA-C  11/4/2019  12:46 PM

## 2019-11-06 ENCOUNTER — OFFICE VISIT (OUTPATIENT)
Dept: CARDIOLOGY CLINIC | Age: 44
End: 2019-11-06

## 2019-11-06 VITALS
OXYGEN SATURATION: 95 % | DIASTOLIC BLOOD PRESSURE: 90 MMHG | RESPIRATION RATE: 22 BRPM | WEIGHT: 276 LBS | HEIGHT: 68 IN | HEART RATE: 94 BPM | BODY MASS INDEX: 41.83 KG/M2 | SYSTOLIC BLOOD PRESSURE: 144 MMHG

## 2019-11-06 DIAGNOSIS — I87.2 VENOUS REFLUX: ICD-10-CM

## 2019-11-06 DIAGNOSIS — M79.604 PAIN OF RIGHT LOWER EXTREMITY: Primary | ICD-10-CM

## 2019-11-06 NOTE — TELEPHONE ENCOUNTER
Sent patients referral, demographics and office notes to Dr. Daniel Disla [facility]#[phone] fax# [phone]
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
## 2019-11-06 NOTE — PROGRESS NOTES
1. Have you been to the ER, urgent care clinic since your last visit? Hospitalized since your last visit? No    2. Have you seen or consulted any other health care providers outside of the [address] [place] [address] since your last visit? Include any pap smears or colon screening.  No       3 most recent PHQ Screens 11/6/2019   PHQ Not Done -   Little interest or pleasure in doing things Several days   Feeling down, depressed, irritable, or hopeless Several days   Total Score PHQ 2 2   Trouble falling or staying asleep, or sleeping too much -   Feeling tired or having little energy -   Poor appetite, weight loss, or overeating -   Feeling bad about yourself - or that you are a failure or have let yourself or your family down -   Trouble concentrating on things such as school, work, reading, or watching TV -   Moving or speaking so slowly that other people could have noticed; or the opposite being so fidgety that others notice -   Thoughts of being better off dead, or hurting yourself in some way -   PHQ 9 Score -   How difficult have these problems made it for you to do your work, take care of your home and get along with others -

## 2019-11-08 ENCOUNTER — OFFICE VISIT (OUTPATIENT)
Dept: ORTHOPEDIC SURGERY | Facility: CLINIC | Age: 44
End: 2019-11-08

## 2019-11-08 VITALS
SYSTOLIC BLOOD PRESSURE: 137 MMHG | BODY MASS INDEX: 41.46 KG/M2 | HEART RATE: 89 BPM | DIASTOLIC BLOOD PRESSURE: 86 MMHG | WEIGHT: 273.6 LBS | HEIGHT: 68 IN

## 2019-11-08 DIAGNOSIS — M17.31 POST-TRAUMATIC OSTEOARTHRITIS OF RIGHT KNEE: Primary | ICD-10-CM

## 2019-11-08 DIAGNOSIS — G89.29 CHRONIC PAIN OF RIGHT KNEE: ICD-10-CM

## 2019-11-08 DIAGNOSIS — M25.561 CHRONIC PAIN OF RIGHT KNEE: ICD-10-CM

## 2019-11-08 DIAGNOSIS — M79.604 PAIN OF RIGHT LOWER EXTREMITY: ICD-10-CM

## 2019-11-08 RX ORDER — BETAMETHASONE SODIUM PHOSPHATE AND BETAMETHASONE ACETATE 3; 3 MG/ML; MG/ML
6 INJECTION, SUSPENSION INTRA-ARTICULAR; INTRALESIONAL; INTRAMUSCULAR; SOFT TISSUE ONCE
Qty: 0.5 ML | Refills: 0
Start: 2019-11-08 | End: 2019-11-08
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
## 2019-11-08 NOTE — PROGRESS NOTES
Verbal order given by Dr. Kiya Valdez to draw up 4 mL 0.25% Sensorcaine and 0.5 mL 30 mg/5mL Betamethasone.

## 2019-11-12 ENCOUNTER — HOSPITAL ENCOUNTER (OUTPATIENT)
Dept: PHYSICAL THERAPY | Age: 44
Discharge: HOME | End: 2019-11-12
Payer: MEDICAID

## 2019-11-12 PROCEDURE — 97162 PT EVAL MOD COMPLEX 30 MIN: CPT
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
## 2019-11-18 ENCOUNTER — OFFICE VISIT (OUTPATIENT)
Dept: ORTHOPEDIC SURGERY | Age: 44
End: 2019-11-18

## 2019-11-18 VITALS
SYSTOLIC BLOOD PRESSURE: 124 MMHG | TEMPERATURE: 97.7 F | DIASTOLIC BLOOD PRESSURE: 94 MMHG | BODY MASS INDEX: 41.37 KG/M2 | WEIGHT: 273 LBS | HEIGHT: 68 IN | HEART RATE: 93 BPM

## 2019-11-18 DIAGNOSIS — M19.172 POST-TRAUMATIC OSTEOARTHRITIS OF LEFT FOOT: Primary | ICD-10-CM

## 2019-11-18 DIAGNOSIS — Q66.6 PES PLANOVALGUS: ICD-10-CM
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
## 2019-11-19 ENCOUNTER — APPOINTMENT (OUTPATIENT)
Dept: PHYSICAL THERAPY | Age: 44
End: 2019-11-19
Payer: MEDICAID

## 2019-11-21 ENCOUNTER — DOCUMENTATION ONLY (OUTPATIENT)
Dept: ORTHOPEDIC SURGERY | Facility: CLINIC | Age: 44
End: 2019-11-21

## 2019-11-21 ENCOUNTER — HOSPITAL ENCOUNTER (OUTPATIENT)
Dept: PHYSICAL THERAPY | Age: 44
Discharge: HOME | End: 2019-11-21
Payer: MEDICAID

## 2019-11-21 ENCOUNTER — APPOINTMENT (OUTPATIENT)
Dept: PHYSICAL THERAPY | Age: 44
End: 2019-11-21
Payer: MEDICAID

## 2019-11-21 PROCEDURE — 97113 AQUATIC THERAPY/EXERCISES: CPT

## 2019-11-21 NOTE — PROGRESS NOTES
Eduardo Hernandez [place] (request for Physical Residual Functional Capacity Evaluation - sent to , for Dr. Fortino Drake, and , for Dr. Jericho Hughes. Called 's office and spoke with , Lawyer Nassar, assistant Maralee Boeck). Advised that we do not do FCE's and that they would have to be done by Physical Therapy. She advised she would make a note of that and we could disregard the requests.

## 2019-11-21 NOTE — PROGRESS NOTES
PHYSICAL THERAPY - DAILY TREATMENT NOTE - AQUATICS    Patient Name: Jaja Congress III        Date: 2019  : 1975   {YES Patient  Verified  Visit #:   2     Insurance: Payor: Denisha Pozo / Plan: [facility] [address] [facility] / Product Type: Managed Care Medicaid /      In time: 100 Out time: 155   Total Treatment Time: 55     Medicare/BCBS Time Tracking (below)   Total Timed Codes (min):  NA 1:1 Treatment Time:  NA     TREATMENT AREA =  Gait instability [R26.81]    SUBJECTIVE    Pain Level (on 0 to 10 scale):  8-9  / 10   Medication Changes/New allergies or changes in medical history, any new surgeries or procedures? NO    If yes, update Summary List   Subjective Functional Status/Changes:  []  No changes reported     Pt reports joining a gym recently and would like to start trying to lose weight. OBJECTIVE      55 min Therapeutic Exercise:  [x]  AQUATIC THERAPY   Rationale:      increase strength, increase ROM, and improve balance to improve the patient's ability to perform ADL's and ambulate with less pain and increase activity tolerance. [x]   Patient Education:  Continue Aquatic Therapy and HEP as instructed          Forward, Retrograde and Lateral Walking at the beginning/end of session with no assistance. LE exercises performed with UE support via wall including heel raises, hip 3-way, mini squats, knee extensions and leg curls. UE exercises incorporated UE flexion, abduction, elbow flexion/extension, lumbo thoracic rotation and cues for engaging core musculature and maintaining upright posture.       UE exercise resistance:                                                                                                             [] none/wrist weights                                                                                   [] small water weights                                                          [] medium water weights [x] extra large water weights                                                         [] back supported on wall    Dynamic Balance assessed as good. Pt required mod/max VC throughout session for proper form and increased safety. Pt required no assistance entering/exiting the pool. Post Treatment Pain Level (on 0 to 10) scale:   8-9  / 10     ASSESSMENT    Assessment/Changes in Function:     Patient tolerated treatment session well and is progressing well towards goals. Right knee pain c heel raises and piriformis stretch. Modified piriformis stretch to soccer kicks and no pain after. Patient's Response to Treatment:  [] Decreased Pain               [] Increased Pain             [x] No Change in Symptoms  [] Decreased Cues Required                                    [] Improved Standing Balance       []  See Progress Note/Recertification   Patient will continue to benefit from skilled PT services to analyze,, cue,, progress,, modify,, demonstrate,, instruct, and address, movement patterns,, therapeutic interventions,, postural abnormalities,, soft tissue restrictions,, ROM,, strength,, functional mobility,, body mechanics/ergonomics, and home and community integration, to attain remaining goals.    Progress toward goals / Updated goals:    1st aquatic therapy session, no notable progress yet     PLAN    []  Upgrade activities as tolerated YES Continue plan of care   []  Discharge due to :    []  Other:      Therapist: Dixon Cho DPT    Date: 11/21/2019 Time: 2:05 PM     Future Appointments   Date Time Provider Ele Ann   11/22/2019 10:00 AM [facility]   12/4/2019  9:15 AM [facility] CT RM 2 [facility]   12/11/2019 10:00 AM Cee Graham MD [facility] [address] [place] [address]

## 2019-11-22 ENCOUNTER — APPOINTMENT (OUTPATIENT)
Dept: PHYSICAL THERAPY | Age: 44
End: 2019-11-22
Payer: MEDICAID

## 2019-12-05 ENCOUNTER — HOSPITAL ENCOUNTER (OUTPATIENT)
Dept: PHYSICAL THERAPY | Age: 44
End: 2019-12-05
Payer: MEDICAID

## 2019-12-06 ENCOUNTER — HOSPITAL ENCOUNTER (OUTPATIENT)
Dept: PHYSICAL THERAPY | Age: 44
End: 2019-12-06
Payer: MEDICAID

## 2019-12-12 ENCOUNTER — HOSPITAL ENCOUNTER (OUTPATIENT)
Dept: PHYSICAL THERAPY | Age: 44
End: 2019-12-12
Payer: MEDICAID

## 2019-12-12 NOTE — PROGRESS NOTES
PHYSICAL THERAPY - DAILY TREATMENT NOTE - AQUATICS Patient Name: Eduardo Knight III        Date: 2019 : 1975   {YES Patient  Verified Visit #:   ***   of   ***  Insurance: Payor: [facility] MEDICAID / [facility] / Product Type: Managed Care Medicaid / In time: *** Out time: *** Total Treatment Time: *** Medicare/[facility] Time Tracking (below) Total Timed Codes (min):  *** 1:1 Treatment Time:  ***  
 
TREATMENT AREA =  Gait instability [R26.81] SUBJECTIVE Pain Level (on 0 to 10 scale):  ***  / 10 Medication Changes/New allergies or changes in medical history, any new surgeries or procedures? NO    If yes, update Summary List  
Subjective Functional Status/Changes:  []  No changes reported *** OBJECTIVE 
 
 
*** min Therapeutic Exercise:  [x]  AQUATIC THERAPY Rationale:      increase strength, increase ROM, and improve balance to improve the patient's ability to perform ADL's and ambulate with less pain and increase activity tolerance. [x]   Patient Education:  Continue Aquatic Therapy and HEP as instructed Forward, Retrograde and Lateral Walking at the beginning/end of session with *** assistance. LE exercises performed with UE support via wall including heel raises, hip 3-way, mini squats, knee extensions and leg curls. UE exercises incorporated UE flexion, abduction, elbow flexion/extension, lumbo thoracic rotation and cues for engaging core musculature and maintaining upright posture. UE exercise resistance: ***                                                                                                      
     [] none/wrist weights                                                                             
     [] small water weights                                                    
     [] medium water weights [] large water weights                                                   
     [] back supported on wall Dynamic Balance assessed as ***. Pt was able to perform: [] SLS [] Tandem with: [] EO [] EC Pt required *** VC throughout session for proper form and increased safety. Pt required *** assistance entering/exiting the pool. Post Treatment Pain Level (on 0 to 10) scale:   ***  / 10 ASSESSMENT Assessment/Changes in Function:  
 
Patient tolerated treatment session well and is progressing well towards goals. *** Patient's Response to Treatment: 
[] Decreased Pain               [] Increased Pain             [] No Change in Symptoms 
[] Decreased Cues Required                                    [] Improved Standing Balance 
 
  
[]  See Progress Note/Recertification Patient will continue to benefit from skilled PT services to analyze,, cue,, progress,, modify,, demonstrate,, instruct, and address, movement patterns,, therapeutic interventions,, postural abnormalities,, soft tissue restrictions,, ROM,, strength,, functional mobility,, body mechanics/ergonomics, and home and community integration, to attain remaining goals. Progress toward goals / Updated goals: 
 
*** PLAN 
 
[]  Upgrade activities as tolerated {YES/NO:[id number]} Continue plan of care  
[]  Discharge due to :   
[]  Other:   
 
Therapist: Kerry Montero DPT Date: 12/12/2019 Time: 12:54 PM  
 
Future Appointments Date Time Provider Ele Ann 12/13/2019 10:00 AM [facility]  
12/17/2019  1:15 PM [facility]  
12/19/2019  1:00 PM [address] [place] [address]  
12/20/2019 10:00 AM [facility]  
12/24/2019  1:00 PM Rajesh Carlos MD [facility] [address] [place] [address]

## 2019-12-13 ENCOUNTER — HOSPITAL ENCOUNTER (OUTPATIENT)
Dept: PHYSICAL THERAPY | Age: 44
Discharge: HOME | End: 2019-12-13
Payer: MEDICAID

## 2019-12-13 PROCEDURE — 97113 AQUATIC THERAPY/EXERCISES: CPT

## 2019-12-13 NOTE — PROGRESS NOTES
[facility] PHYSICAL THERAPY  [address] [place] [address] Rachell Lima, [address] - Phone: [phone]  Fax: [phone]  PROGRESS NOTE  Patient Name: John Mcgowan III : 1975   Treatment/Medical Diagnosis: Gait instability [R26.81]   Referral Source: Ashwin Chang*     Date of Initial Visit: 2019 Attended Visits: 3 Missed Visits: 1     SUMMARY OF TREATMENT  Pt has been participating in our aquatic therapy program which includes ambulation in water for warm-up and cool down, upper and lower extremity exercises for core stabilization and pain management. CURRENT STATUS  Pt has had inconsistent attendance with therapy secondary to missing a visit for illness and awaiting signed plan of care per [facility] insurance guidelines. Pt has only attended initial evaluation and 2 aquatic sessions at this time. Pt reports no change in his left ankle and right knee pain at this time, continues to c/o 9/10 pain with ADL's and reports increased symptoms with standing, walking and sitting for too long. Pt tolerating full 45-55 minute aquatic sessions with reports of delayed onset muscle soreness afterward. Goal/Measure of Progress Goal Met? 1. Patient to be compliant with aquatic program attendance to ensure maximum benefits of PT. Status at last Eval: Aquatic PT initiated 2019 Current Status: Pt has had inconsistent attendance no   2. Patient to tolerate greater than/equal to 30 minutes of aquatic therapy to improve ADL tolerance   Status at last Eval: NA Current Status: 45-55 minutes  yes   3. Patient will report less than or equal to 5/10 pain at worst during aquatic therapy to allow increased activity tolerance. Status at last Eval: NA Current Status: 8-9/10 no     New Goals to be achieved in __2-4__  weeks:  1) Patient to be Safe and Independent with aquatherapy to transition to aquatic HEP for self management/prevention after DC.   2) Patient will demonstrated 5 times sit to stand test of at least 23 seconds to decrease falls risk. 3) Patient will increase FOTO Functional Status score to 53 to decrease functional limitations. 4) Patient will demonstrate the ability to singe leg raise for 5 repetitions on either LE to engage in age appropriate activities. RECOMMENDATIONS  Pt would benefit from continued aquatic therapy to address LE pain with ROM and strengthening exercise to facilitate increased functional mobility and activity tolerance. If you have any questions/comments please contact us directly at [phone]. Thank you for allowing us to assist in the care of your patient. LPTA Signature: Daljit Comer PTA  Date: 12/13/2019   PT Signature: Marysol Connelly DPT Time: 10:03 AM   NOTE TO PHYSICIAN:  PLEASE COMPLETE THE ORDERS BELOW AND FAX TO   [facility] Physical Therapy: [phone]. If you are unable to process this request in 24 hours please contact our office: [phone].    ___ I have read the above report and request that my patient continue as recommended.   ___ I have read the above report and request that my patient continue therapy with the following changes/special instructions:_________________________________________________________   ___ I have read the above report and request that my patient be discharged from therapy.      Physician Signature:        Date:       Time:

## 2019-12-13 NOTE — PROGRESS NOTES
HYSICAL THERAPY - DAILY TREATMENT NOTE - AQUATICS    Patient Name: Hang Johnston III        Date: 2019  : 1975   YES Patient  Verified  Visit #:   3     Insurance: Payor: V[place] / Plan: [facility] [address] [facility] CCCP / Product Type: Managed Care Medicaid /      In time: 10:00 Out time: 10:54   Total Treatment Time: 54     Medicare/BCBS Time Tracking (below)   Total Timed Codes (min):  na 1:1 Treatment Time:  na     TREATMENT AREA =  Gait instability [R26.81]    SUBJECTIVE    Pain Level (on 0 to 10 scale):  9  / 10   Medication Changes/New allergies or changes in medical history, any new surgeries or procedures? NO    If yes, update Summary List   Subjective Functional Status/Changes:  []  No changes reported     Pt reports no change in his pain currently, still has 9/10 left ankle and right knee pain with daily activities, states increased pain with walking, standing and sitting for too long. Pt reports his whole body was sore after his first aquatic session. OBJECTIVE      54 min Therapeutic Exercise:  [x]  AQUATIC THERAPY   Rationale:      increase strength, increase ROM, and improve balance to improve the patient's ability to perform ADL's and ambulate with less pain and increase activity tolerance. [x]   Patient Education:  Continue Aquatic Therapy and HEP as instructed          Forward, Retrograde and Lateral Walking at the beginning/end of session with no assistance. LE exercises performed with 1-2 UE support including heel raises, hip 3-way, mini squats, knee extensions and leg curls. Pt unable to tolerate piriformis stretch on right LE secondary to limited right knee flexion ROM. UE exercises incorporated UE flexion, abduction, elbow flexion/extension, lumbo thoracic rotation and cues for engaging core musculature and maintaining upright posture.       UE exercise resistance: decreased pt to large (green/white) resistance weights secondary to c/o DOMS after last session. [] none/wrist weights                                                                                   [] small water weights                                                          [] medium water weights                                                   [x] large water weights                                                         [] back supported on wall    Dynamic Balance assessed as good. Pt required mod to max VC throughout session for proper form and increased safety. Pt required no assistance entering/exiting the pool. Post Treatment Pain Level (on 0 to 10) scale:   8-9  / 10     ASSESSMENT    Assessment/Changes in Function:     Patient tolerated treatment session well and is progressing well towards goals. Patient's Response to Treatment:  [] Decreased Pain               [] Increased Pain             [x] No Change in Symptoms  [] Decreased Cues Required                                    [] Improved Standing Balance       [x]  See Progress Note/Recertification   Patient will continue to benefit from skilled PT services to analyze,, cue,, progress,, modify,, demonstrate,, instruct, and address, movement patterns,, therapeutic interventions,, postural abnormalities,, soft tissue restrictions,, ROM,, strength,, functional mobility,, body mechanics/ergonomics, and home and community integration, to attain remaining goals. Progress toward goals / Updated goals:    1) Patient will be compliant with HEP for symptom management at home. 2) Patient to be compliant with aquatic program attendance to ensure maximum benefits of PT.  Pt has had inconsistent attendance with aquatic program secondary to waiting on signed POC per Medicaid guidelines  3) Patient to tolerate greater than/equal to 30 minutes of aquatic therapy to improve ADL tolerance Pt tolerating 45-55 minutes aquatic sessions with reports of DOMS  4) Patient will report less than or equal to 5/10 pain at worst during aquatic therapy to allow increased activity tolerance. 8-9/10 pain with aquatic ex     PLAN    []  Upgrade activities as tolerated YES Continue plan of care   []  Discharge due to :    []  Other:      Therapist: STANLEY Almanza     Date: 12/13/2019 Time: 9:57 AM     Future Appointments   Date Time Provider Ele Ann   12/13/2019 10:00 AM [facility]   12/17/2019  1:15 PM [facility] CT RM 2 [facility]   12/19/2019  1:00 PM [facility]   12/20/2019 10:00 AM [facility]   12/24/2019  1:00 PM Kaleigh Penn MD [address]

## 2019-12-17 ENCOUNTER — HOSPITAL ENCOUNTER (OUTPATIENT)
Dept: CT IMAGING | Age: 44
Discharge: HOME | End: 2019-12-17
Attending: SURGERY
Payer: MEDICAID

## 2019-12-17 DIAGNOSIS — M79.604 PAIN OF RIGHT LOWER EXTREMITY: ICD-10-CM

## 2019-12-17 PROCEDURE — 73706 CT ANGIO LWR EXTR W/O&W/DYE: CPT

## 2019-12-17 PROCEDURE — 74011000258 HC RX REV CODE- 258: Performed by: SURGERY

## 2019-12-17 PROCEDURE — 74011636320 HC RX REV CODE- 636/320: Performed by: SURGERY

## 2019-12-17 RX ORDER — SODIUM CHLORIDE 9 MG/ML
100 INJECTION, SOLUTION INTRAVENOUS
Status: COMPLETED | OUTPATIENT
Start: 2019-12-17 | End: 2019-12-17

## 2019-12-17 RX ADMIN — IOPAMIDOL 122 ML: 755 INJECTION, SOLUTION INTRAVENOUS at 13:22

## 2019-12-17 RX ADMIN — SODIUM CHLORIDE 100 ML: 900 INJECTION, SOLUTION INTRAVENOUS at 13:22

## 2019-12-19 ENCOUNTER — HOSPITAL ENCOUNTER (OUTPATIENT)
Dept: PHYSICAL THERAPY | Age: 44
Discharge: HOME | End: 2019-12-19
Payer: MEDICAID

## 2019-12-19 PROCEDURE — 97113 AQUATIC THERAPY/EXERCISES: CPT

## 2019-12-19 NOTE — PROGRESS NOTES
PHYSICAL THERAPY - DAILY TREATMENT NOTE - AQUATICS    Patient Name: Bryon Carrington III        Date: 2019  : 1975   {YES Patient  Verified  Visit #:   4     Insurance: Payor: Shanna Moses / Plan: [facility] [address] [facility] CCCP / Product Type: Managed Care Medicaid /      In time: 100 Out time: 155   Total Treatment Time: 55     Medicare/BCBS Time Tracking (below)   Total Timed Codes (min):  NA 1:1 Treatment Time:  NA     TREATMENT AREA =  Gait instability [R26.81]    SUBJECTIVE    Pain Level (on 0 to 10 scale):  8  / 10   Medication Changes/New allergies or changes in medical history, any new surgeries or procedures? NO    If yes, update Summary List   Subjective Functional Status/Changes:  []  No changes reported     Pt reports missing sessions secondary to lack of MD co-sign. OBJECTIVE      55 min Therapeutic Exercise:  [x]  AQUATIC THERAPY   Rationale:      increase strength, increase ROM, and improve balance to improve the patient's ability to perform ADL's and ambulate with less pain and increase activity tolerance. [x]   Patient Education:  Continue Aquatic Therapy and HEP as instructed          Forward, Retrograde and Lateral Walking at the beginning/end of session with no assistance. LE exercises performed with UE support via wall including heel raises, hip 3-way, mini squats, knee extensions and leg curls. UE exercises incorporated UE flexion, abduction, elbow flexion/extension, lumbo thoracic rotation and cues for engaging core musculature and maintaining upright posture.       UE exercise resistance:                                                                                                             [] none/wrist weights                                                                                   [] small water weights                                                          [] medium water weights [x] extra-large water weights                                                         [] back supported on wall    Dynamic Balance assessed as good. Pt required min VC throughout session for proper form and increased safety. Pt required no assistance entering/exiting the pool. Difficulty with right piriformis stretch but able to substitute with soccer kicks. Post Treatment Pain Level (on 0 to 10) scale:   5  / 10     ASSESSMENT    Assessment/Changes in Function:     Patient tolerated treatment session well and is progressing well towards goals. Patient's Response to Treatment:  [x] Decreased Pain               [] Increased Pain             [] No Change in Symptoms  [x] Decreased Cues Required                                    [] Improved Standing Balance       []  See Progress Note/Recertification   Patient will continue to benefit from skilled PT services to analyze,, cue,, progress,, modify,, demonstrate,, instruct, and address, movement patterns,, therapeutic interventions,, postural abnormalities,, soft tissue restrictions,, ROM,, strength,, functional mobility,, body mechanics/ergonomics, and home and community integration, to attain remaining goals. Progress toward goals / Updated goals:    1st session since progress note no notable progress yet.       PLAN    []  Upgrade activities as tolerated YES Continue plan of care   []  Discharge due to :    []  Other:      Therapist: Clinton Jackson DPT    Date: 12/19/2019 Time: 12:57 PM     Future Appointments   Date Time Provider Ele Ann   12/19/2019  1:00 PM [address] [place] [address]   12/20/2019 10:00 AM [facility]   12/24/2019  1:00 PM Deviak Lujan MD [facility] [address] [place] [address]   12/27/2019 10:00 AM [facility]   1/2/2020  1:00 PM [address] [place] [address]   1/3/2020 10:00 AM [facility]   1/9/2020  1:15 PM Marlene Palmer NP [facility] [facility] [address]   1/10/2020 10:00 AM [facility]   1/16/2020  1:00 PM [facility]   1/17/2020 10:00 AM [facility]   1/23/2020  1:00 PM [address] [place] [address]   1/24/2020 10:00 AM [facility]   1/30/2020  1:00 PM [address] [place] [address]   1/31/2020 10:00 AM [facility]

## 2019-12-20 ENCOUNTER — APPOINTMENT (OUTPATIENT)
Dept: PHYSICAL THERAPY | Age: 44
End: 2019-12-20
Payer: MEDICAID

## 2019-12-23 DIAGNOSIS — I10 ESSENTIAL HYPERTENSION: ICD-10-CM

## 2019-12-23 RX ORDER — CHLORTHALIDONE 25 MG/1
TABLET ORAL
Qty: 30 TAB | Refills: 0 | Status: SHIPPED | OUTPATIENT
Start: 2019-12-23 | End: 2020-01-26

## 2019-12-24 ENCOUNTER — OFFICE VISIT (OUTPATIENT)
Dept: CARDIOLOGY CLINIC | Age: 44
End: 2019-12-24

## 2019-12-24 VITALS
DIASTOLIC BLOOD PRESSURE: 68 MMHG | HEART RATE: 89 BPM | SYSTOLIC BLOOD PRESSURE: 110 MMHG | RESPIRATION RATE: 20 BRPM | OXYGEN SATURATION: 96 % | WEIGHT: 273 LBS | HEIGHT: 68 IN | BODY MASS INDEX: 41.37 KG/M2

## 2019-12-24 DIAGNOSIS — I72.4 POPLITEAL ARTERY ANEURYSM (HCC): ICD-10-CM

## 2019-12-24 DIAGNOSIS — I87.2 VENOUS REFLUX: ICD-10-CM

## 2019-12-24 DIAGNOSIS — M79.604 PAIN OF RIGHT LOWER EXTREMITY: Primary | ICD-10-CM

## 2019-12-24 RX ORDER — OXYCODONE AND ACETAMINOPHEN 10; 325 MG/1; MG/1
TABLET ORAL
COMMUNITY
End: 2020-08-17
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
## 2019-12-24 NOTE — PROGRESS NOTES
1. Have you been to the ER, urgent care clinic since your last visit? Hospitalized since your last visit? No    2. Have you seen or consulted any other health care providers outside of the [address] [place] [address] since your last visit? Include any pap smears or colon screening.  Yes Reason for visit: pain management          3 most recent PHQ Screens 12/24/2019   PHQ Not Done -   Little interest or pleasure in doing things Several days   Feeling down, depressed, irritable, or hopeless Several days   Total Score PHQ 2 2   Trouble falling or staying asleep, or sleeping too much -   Feeling tired or having little energy -   Poor appetite, weight loss, or overeating -   Feeling bad about yourself - or that you are a failure or have let yourself or your family down -   Trouble concentrating on things such as school, work, reading, or watching TV -   Moving or speaking so slowly that other people could have noticed; or the opposite being so fidgety that others notice -   Thoughts of being better off dead, or hurting yourself in some way -   PHQ 9 Score -   How difficult have these problems made it for you to do your work, take care of your home and get along with others -

## 2019-12-27 ENCOUNTER — HOSPITAL ENCOUNTER (OUTPATIENT)
Dept: PHYSICAL THERAPY | Age: 44
Discharge: HOME | End: 2019-12-27
Payer: MEDICAID

## 2019-12-27 PROCEDURE — 97113 AQUATIC THERAPY/EXERCISES: CPT
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
## 2020-01-02 ENCOUNTER — HOSPITAL ENCOUNTER (OUTPATIENT)
Dept: PHYSICAL THERAPY | Age: 45
Discharge: HOME | End: 2020-01-02
Payer: MEDICAID

## 2020-01-02 PROCEDURE — 97113 AQUATIC THERAPY/EXERCISES: CPT

## 2020-01-02 NOTE — PROGRESS NOTES
PHYSICAL THERAPY - DAILY TREATMENT NOTE - AQUATICS    Patient Name: Ronald Mcleod III        Date: 2020  : 1975   {YES Patient  Verified  Visit #:   6     Insurance: Payor: Prince Monique / Plan: [facility] [address] [facility] CCCP / Product Type: Managed Care Medicaid /      In time: 12:55 Out time: 150   Total Treatment Time: 55     Medicare/BCBS Time Tracking (below)   Total Timed Codes (min):  Na 1:1 Treatment Time:  NA     TREATMENT AREA =  Gait instability [R26.81]    SUBJECTIVE    Pain Level (on 0 to 10 scale):  9  / 10   Medication Changes/New allergies or changes in medical history, any new surgeries or procedures? NO    If yes, update Summary List   Subjective Functional Status/Changes:  []  No changes reported     Pt arrives to session early and entered the pool without instructions. Therapist informed pt that he would have to wait until class starts before entering the pool. Pt reported understanding. Pt continues to report elevated pain without any signs of apprehension. No pain reported during session. OBJECTIVE      55 min Therapeutic Exercise:  [x]  AQUATIC THERAPY   Rationale:      increase strength, increase ROM, and improve balance to improve the patient's ability to perform ADL's and ambulate with less pain and increase activity tolerance. [x]   Patient Education:  Continue Aquatic Therapy and HEP as instructed          Forward, Retrograde and Lateral Walking at the beginning/end of session with no assistance. LE exercises performed with UE support via wall including heel raises, hip 3-way, mini squats, knee extensions and leg curls. UE exercises incorporated UE flexion, abduction, elbow flexion/extension, lumbo thoracic rotation and cues for engaging core musculature and maintaining upright posture.       UE exercise resistance:                                                                                                             [] none/wrist weights                                                                                   [] small water weights                                                          [] medium water weights                                                   [x]extra large water weights                                                         [] back supported on wall    Dynamic Balance assessed as good. Pt was able to perform: [x] SLS [] Tandem with: [x] EO [] EC during single leg squats and piriformis stretch. Pt required min/no VC throughout session for proper form and increased safety. Pt required no assistance entering/exiting the pool. Post Treatment Pain Level (on 0 to 10) scale:   8  / 10     ASSESSMENT    Assessment/Changes in Function:     Patient tolerated treatment session well and is progressing well towards goals. Pt is requing minimal/no verbal cuing during treatment session. Patient's Response to Treatment:  [x] Decreased Pain               [] Increased Pain             [] No Change in Symptoms  [x] Decreased Cues Required                                    [] Improved Standing Balance       []  See Progress Note/Recertification   Patient will continue to benefit from skilled PT services to analyze,, cue,, progress,, modify,, demonstrate,, instruct, and address, movement patterns,, therapeutic interventions,, postural abnormalities,, soft tissue restrictions,, ROM,, strength,, functional mobility,, body mechanics/ergonomics, and home and community integration, to attain remaining goals. Progress toward goals / Updated goals:    Pt has began recreational exercise outside of therapy sessions.      PLAN    []  Upgrade activities as tolerated YES Continue plan of care   []  Discharge due to :    []  Other:      Therapist: Natalia Tyson DPT    Date: 1/2/2020 Time: 12:52 PM     Future Appointments   Date Time Provider Ele Ann   1/2/2020  1:00 PM [facility] AT [facility]   1/3/2020 10:00 AM [facility]   1/9/2020  1:15 PM Millie Edwards NP [address] [place] [address]   1/10/2020 10:00 AM [facility]   1/16/2020  1:00 PM [address] [place] [address]   1/17/2020 10:00 AM [facility]   1/23/2020  1:00 PM [address] [place] [address]   1/24/2020 10:00 AM [facility]   1/30/2020  1:00 PM [address] [place] [address]   1/31/2020 10:00 AM [facility]

## 2020-01-03 ENCOUNTER — HOSPITAL ENCOUNTER (OUTPATIENT)
Dept: PHYSICAL THERAPY | Age: 45
Discharge: HOME | End: 2020-01-03
Payer: MEDICAID

## 2020-01-03 PROCEDURE — 97113 AQUATIC THERAPY/EXERCISES: CPT

## 2020-01-03 NOTE — PROGRESS NOTES
HYSICAL THERAPY - DAILY TREATMENT NOTE - AQUATICS    Patient Name: Alfred Hale III        Date: 1/3/2020  : 1975   YES Patient  Verified  Visit #:   7     Insurance: Payor: Marcos Corona / Plan: [facility] [address] [facility] CCCP / Product Type: Managed Care Medicaid /      In time: 10:01 Out time: 10:55   Total Treatment Time: 54     Medicare/BCBS Time Tracking (below)   Total Timed Codes (min):  na 1:1 Treatment Time:  na     TREATMENT AREA =  Gait instability [R26.81]    SUBJECTIVE    Pain Level (on 0 to 10 scale):  8  / 10   Medication Changes/New allergies or changes in medical history, any new surgeries or procedures? NO    If yes, update Summary List   Subjective Functional Status/Changes:  []  No changes reported     Pt states that yesterday's session was the first time he felt like he really got a work out after his aquatic therapy, reports his muscles felt a little sore \"like I actually did something. \"       OBJECTIVE      54 min Therapeutic Exercise:  [x]  AQUATIC THERAPY   Rationale:      increase strength, increase ROM, and improve balance to improve the patient's ability to perform ADL's and ambulate with less pain and increase activity tolerance. [x]   Patient Education:  Continue Aquatic Therapy and HEP as instructed          Forward, Retrograde and Lateral Walking at the beginning/end of session with no assistance. Pt performed lateral monster walk and jogging in deep end for warm up and cool down laps. LE exercises performed with 0-1 UE support including heel raises, hip 3-way, mini squats, knee extensions and leg curls. Pt performed single limb HR and squats in water. Pt with c/o soreness dorsal ankle after performing single limb squats. UE exercises incorporated UE flexion, abduction, elbow flexion/extension, lumbo thoracic rotation and cues for engaging core musculature and maintaining upright posture.      Peterson De La Cruz for posture with UE aquatic ex. UE exercise resistance:                                                                                                             [] none/wrist weights                                                                                   [] small water weights                                                          [] medium water weights                                                   [x] large water weights                                                         [] back supported on wall    Pt required min VC throughout session for proper form and increased safety. Pt required no assistance entering/exiting the pool. Post Treatment Pain Level (on 0 to 10) scale:   8  / 10     ASSESSMENT    Assessment/Changes in Function:     Patient with good tolerance to progression of aquatic therapeutic exercise, reporting DOMS after yesterday's session. Patient's Response to Treatment:  [] Decreased Pain               [] Increased Pain             [x] No Change in Symptoms  [] Decreased Cues Required                                    [] Improved Standing Balance       []  See Progress Note/Recertification   Patient will continue to benefit from skilled PT services to analyze,, cue,, progress,, modify,, demonstrate,, instruct, and address, movement patterns,, therapeutic interventions,, postural abnormalities,, soft tissue restrictions,, ROM,, strength,, functional mobility,, body mechanics/ergonomics, and home and community integration, to attain remaining goals. Progress toward goals / Updated goals:    1) Patient to be Safe and Independent with aquatherapy to transition to aquatic HEP for self management/prevention after DC. 2) Patient will demonstrated 5 times sit to stand test of at least 23 seconds to decrease falls risk. single limb squats for quad strengthening  3) Patient will increase FOTO Functional Status score to 53 to decrease functional limitations.   4) Patient will demonstrate the ability to singe leg raise for 5 repetitions on either LE to engage in age appropriate activities.       PLAN    []  Upgrade activities as tolerated YES Continue plan of care   []  Discharge due to :    []  Other:      Therapist: STANLEY Yee     Date: 1/3/2020 Time: 11:00 AM     Future Appointments   Date Time Provider Ele Ann   1/9/2020  1:15 PM Luis Angel Villeda NP [address] [place] [address]   1/10/2020 10:00 AM [facility]   1/16/2020  1:00 PM [address] [place] [address]   1/17/2020 10:00 AM [facility]   1/23/2020  1:00 PM [address] [place] [address]   1/24/2020 10:00 AM [facility]   1/30/2020  1:00 PM [address] [place] [address]   1/31/2020 10:00 AM [facility]

## 2020-01-10 ENCOUNTER — APPOINTMENT (OUTPATIENT)
Dept: PHYSICAL THERAPY | Age: 45
End: 2020-01-10
Payer: MEDICAID

## 2020-01-16 ENCOUNTER — HOSPITAL ENCOUNTER (OUTPATIENT)
Dept: PHYSICAL THERAPY | Age: 45
Discharge: HOME | End: 2020-01-16
Payer: MEDICAID

## 2020-01-16 PROCEDURE — 97113 AQUATIC THERAPY/EXERCISES: CPT

## 2020-01-16 NOTE — PROGRESS NOTES
[address]  [facility] PHYSICAL THERAPY  [address] [place], [address] - Phone: [phone]  Fax: [phone]  Progress Note/CONTINUED R [address] for PHYSICAL THERAPY          Patient Name: Abdelrahman Ca III : 1975   Treatment/Medical Diagnosis: Gait instability [R26.81]   Referral Source: Chance Lazo Start of Care [facility]): 2019   Prior Hospitalization: See Medical History Provider #: [id number]   Medications: Verified on Patient Summary List   Visits from [facility]: 7 Missed Visits: 3   SUMMARY OF TREATMENT  Patient's POC has consisted of therex, therapeutic activities, manual therapy prn, modalities prn, pt. education, and a comprehensive HEP. Treatment strategies used to address functional mobility deficits, ROM deficits, strength deficits, analyze and address soft tissue restrictions, analyze and cue movement patterns, analyze and modify body mechanics/ergonomics, assess and modify postural abnormalities and instruct in home and community integration. CURRENT STATUS  Pt reports 35-40% improvement since start of care. Pt reports that he continues to have pain in lower back and right knee but reports no symptoms in left ankle. He continues to report pain ranging from 7-9/10 at the beginning and end of session. Pt tolerating full 45-55 minute aquatic sessions with reports of delayed onset muscle soreness afterward. Pt continues to express interest in losing weight but has been educated regarding the intended purposes of physical therapy. He was recommended to speak with MD regarding referral to dietician. FOTO: 49/100 (40/100 at [facility])   5 Times Sit to Stand: 18\" (31\" at [facility])  SL HR: 10 reps each LE    Goal/Measure of Progress Goal Met?   1) Patient to be Safe and Independent with aquatherapy to transition to aquatic HEP for self management/prevention after DC.     2) Patient will demonstrated 5 times sit to stand test of at least 23 seconds to decrease falls risk.      3) Patient will increase FOTO Functional Status score to 53 to decrease functional limitations. 4) Patient will demonstrate the ability to singe leg raise for 5 repetitions on either LE to engage in age appropriate activities.        NOT MET      MET        NOT MET          MET        New Goals to be achieved in   3  weeks:  1) Patient to be Safe and Independent with aquatherapy to transition to aquatic HEP for self management/prevention after DC. .   2) Patient will increase FOTO Functional Status score to 53 to decrease functional limitations.      Frequency / Duration:   Patient to be seen   2   times per week for   3    weeks:    RECOMMENDATIONS:   Pt would benefit from continued aquatic therapy to address LE pain with ROM and strengthening exercise to facilitate increased functional mobility and activity tolerance. If you have any questions/comments please contact us directly at [phone]. Thank you for allowing us to assist in the care of your patient. Therapist Signature: Kerry Montero DPT Date: 3/19/6171   Certification Period:  Reporting Period: NA  NA Time: 2:43 PM   NOTE TO PHYSICIAN:  PLEASE COMPLETE THE ORDERS BELOW AND FAX TO   [facility] Physical Therapy: [phone]. If you are unable to process this request in 24 hours please contact our office: [phone].    ___ I have read the above report and request that my patient continue as recommended.   ___ I have read the above report and request that my patient continue therapy with the following changes/special instructions: ________________________________________________   ___ I have read the above report and request that my patient be discharged from therapy.      Physician Signature:        Date:       Time:

## 2020-01-16 NOTE — PROGRESS NOTES
PHYSICAL THERAPY - DAILY TREATMENT NOTE - AQUATICS    Patient Name: Deric Stanley III        Date: 2020  : 1975   {YES Patient  Verified  Visit #:   8     Insurance: Payor: Aisharigoberto Hughes / [facility] / Product Type: Managed Care Medicaid /      In time: 107 Out time: 200   Total Treatment Time: 53     Medicare/BCBS Time Tracking (below)   Total Timed Codes (min):  NA 1:1 Treatment Time:  Na     TREATMENT AREA =  Gait instability [R26.81]    SUBJECTIVE    Pain Level (on 0 to 10 scale):  -8  / 10   Medication Changes/New allergies or changes in medical history, any new surgeries or procedures? NO    If yes, update Summary List   Subjective Functional Status/Changes:  []  No changes reported     Pt reports 35-40% improvement since start of care. Pt reports that he continues to have pain in lower back and right knee. OBJECTIVE      53 min Therapeutic Exercise:  [x]  AQUATIC THERAPY   Rationale:      increase strength, increase ROM, and improve balance to improve the patient's ability to perform ADL's and ambulate with less pain and increase activity tolerance. [x]   Patient Education:  Continue Aquatic Therapy and HEP as instructed          Forward, Retrograde and Lateral Walking at the beginning/end of session with no assistance. LE exercises performed with UE support via wall including heel raises, hip 3-way, mini squats, knee extensions and leg curls. UE exercises incorporated UE flexion, abduction, elbow flexion/extension, lumbo thoracic rotation and cues for engaging core musculature and maintaining upright posture.       UE exercise resistance:                                                                                                             [] none/wrist weights                                                                                   [] small water weights [] medium water weights                                                   [x] extra large water weights                                                         [] back supported on wall      Pt required min/no VC throughout session for proper form and increased safety. Pt required no assistance entering/exiting the pool. FOTO: 49/100 (40/100 at [facility])   5 Times Sit to Stand: 18\"  SL HR: 10 reps each LE     Post Treatment Pain Level (on 0 to 10) scale:   9  / 10     ASSESSMENT    Assessment/Changes in Function:   See PN     []  See Progress Note/Recertification   Patient will continue to benefit from skilled PT services to analyze,, cue,, progress,, modify,, demonstrate,, instruct, and address, movement patterns,, therapeutic interventions,, postural abnormalities,, soft tissue restrictions,, ROM,, strength,, functional mobility,, body mechanics/ergonomics, and home and community integration, to attain remaining goals.    Progress toward goals / Updated goals:    See PN         PLAN    []  Upgrade activities as tolerated YES Continue plan of care   []  Discharge due to :    []  Other:      Therapist: Judy Warner DPT    Date: 1/16/2020 Time: 1:08 PM     Future Appointments   Date Time Provider Ele Ann   1/23/2020  1:00 PM [address] [place] [address]   1/24/2020 10:00 AM [facility]   1/27/2020  1:45 PM Sarah Meléndez NP [address] [address]   1/30/2020  1:00 PM [address] [place] [address]   1/31/2020 10:00 AM [facility]

## 2020-01-17 ENCOUNTER — APPOINTMENT (OUTPATIENT)
Dept: PHYSICAL THERAPY | Age: 45
End: 2020-01-17
Payer: MEDICAID

## 2020-01-23 ENCOUNTER — HOSPITAL ENCOUNTER (OUTPATIENT)
Dept: PHYSICAL THERAPY | Age: 45
End: 2020-01-23
Payer: MEDICAID

## 2020-01-24 ENCOUNTER — APPOINTMENT (OUTPATIENT)
Dept: PHYSICAL THERAPY | Age: 45
End: 2020-01-24
Payer: MEDICAID

## 2020-01-25 DIAGNOSIS — I10 ESSENTIAL HYPERTENSION: ICD-10-CM

## 2020-01-26 RX ORDER — CHLORTHALIDONE 25 MG/1
TABLET ORAL
Qty: 30 TAB | Refills: 0 | Status: SHIPPED | OUTPATIENT
Start: 2020-01-26 | End: 2020-02-29

## 2020-01-27 ENCOUNTER — OFFICE VISIT (OUTPATIENT)
Dept: FAMILY MEDICINE CLINIC | Age: 45
End: 2020-01-27

## 2020-01-27 VITALS
SYSTOLIC BLOOD PRESSURE: 122 MMHG | BODY MASS INDEX: 42.44 KG/M2 | DIASTOLIC BLOOD PRESSURE: 81 MMHG | OXYGEN SATURATION: 95 % | WEIGHT: 280 LBS | TEMPERATURE: 96.4 F | HEIGHT: 68 IN | HEART RATE: 95 BPM | RESPIRATION RATE: 16 BRPM

## 2020-01-27 DIAGNOSIS — H92.13 OTORRHEA OF BOTH EARS: ICD-10-CM

## 2020-01-27 DIAGNOSIS — R53.82 CHRONIC FATIGUE: ICD-10-CM

## 2020-01-27 DIAGNOSIS — I10 ESSENTIAL HYPERTENSION: Primary | ICD-10-CM

## 2020-01-27 RX ORDER — NEOMYCIN SULFATE, POLYMYXIN B SULFATE AND HYDROCORTISONE 10; 3.5; 1 MG/ML; MG/ML; [USP'U]/ML
3 SUSPENSION/ DROPS AURICULAR (OTIC) 4 TIMES DAILY
Qty: 10 ML | Refills: 0 | Status: SHIPPED | OUTPATIENT
Start: 2020-01-27 | End: 2020-02-06
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
## 2020-01-27 NOTE — PATIENT INSTRUCTIONS
Heart Failure and Sleep Apnea: Care Instructions Your Care Instructions Sleep apnea is fairly common in people with advanced heart failure. Sleep apnea means you stop breathing for 10 seconds or longer during sleep. It may cause you to snore loudly and not sleep well, so you wake up feeling tired. Getting treatment for sleep apnea can help you sleep and feel better. It may also help keep your heart failure from getting worse. Follow-up care is a key part of your treatment and safety. Be sure to make and go to all appointments, and call your doctor if you are having problems. It's also a good idea to know your test results and keep a list of the medicines you take. How can you care for yourself at home? · Lose weight, if needed. It may reduce the number of times you stop breathing or have slowed breathing. · Go to bed at the same time every night. · Sleep on your side. It may stop mild apnea. If you tend to roll onto your back, sew a pocket in the back of your pajama top. Put a tennis ball into the pocket, and stitch the pocket shut. This will help keep you from sleeping on your back. · Avoid alcohol and medicines such as sleeping pills and sedatives before bed. · Do not smoke. Smoking can make heart failure and sleep apnea worse. If you need help quitting, talk to your doctor about stop-smoking programs and medicines. These can increase your chances of quitting for good. · Prop up the head of your bed 4 to 6 inches by putting bricks under the legs of the bed. · Try a continuous positive airway pressure (CPAP) breathing machine if your doctor recommends it. The machine keeps your airway from closing when you sleep. It may take time for you to get used to CPAP. · If your nose feels dry or bleeds when you use one of these machines, talk with your doctor about increasing moisture in the air. A humidifier may help.  
· If your nose is runny or stuffy from using a breathing machine, talk with your doctor before using medicines to relieve congestion. · Talk to your doctor if you are sleepy during the day and it gets in the way of the normal things you do. Do not drive when you are drowsy. When should you call for help? Watch closely for changes in your health, and be sure to contact your doctor if you have any problems. Where can you learn more? Go to http://marilin-desirae.info/. Enter A820 in the search box to learn more about \"Heart Failure and Sleep Apnea: Care Instructions. \" Current as of: April 9, 2019 Content Version: 12.2 © 4318-1649 [employer]. Care instructions adapted under license by [employer] (which disclaims liability or warranty for this information). If you have questions about a medical condition or this instruction, always ask your healthcare professional. [address] any warranty or liability for your use of this information. Sleep Apnea: Care Instructions Your Care Instructions Sleep apnea means that you frequently stop breathing for 10 seconds or longer during sleep. It can be mild to severe, based on the number of times an hour that you stop breathing or have slowed breathing. Blocked or narrowed airways in your nose, mouth, or throat can cause sleep apnea. Your airway can become blocked when your throat muscles and tongue relax during sleep. You can treat sleep apnea at home by making lifestyle changes. You also can use a CPAP breathing machine that keeps tissues in the throat from blocking your airway. Or your doctor may suggest that you use a breathing device while you sleep. It helps keep your airway open. This could be a device that you put in your mouth. In some cases, surgery may be needed to remove enlarged tissues in the throat. Follow-up care is a key part of your treatment and safety.  Be sure to make and go to all appointments, and call your doctor if you are having problems. It's also a good idea to know your test results and keep a list of the medicines you take. How can you care for yourself at home? · Lose weight, if needed. It may reduce the number of times you stop breathing or have slowed breathing. · Sleep on your side. It may stop mild apnea. If you tend to roll onto your back, sew a pocket in the back of your pajama top. Put a tennis ball into the pocket, and stitch the pocket shut. This will help keep you from sleeping on your back. · Avoid alcohol and medicines such as sleeping pills and sedatives before bed. · Do not smoke. Smoking can make sleep apnea worse. If you need help quitting, talk to your doctor about stop-smoking programs and medicines. These can increase your chances of quitting for good. · Prop up the head of your bed 4 to 6 inches by putting bricks under the legs of the bed. · Treat breathing problems, such as a stuffy nose, caused by a cold or allergies. · Try a continuous positive airway pressure (CPAP) breathing machine if your doctor recommends it. The machine keeps your airway open when you sleep. · If CPAP does not work for you, ask your doctor if you can try other breathing machines. A bilevel positive airway pressure machine uses one type of air pressure for breathing in and another type for breathing out. Another device raises or lowers air pressure as needed while you breathe. · Talk to your doctor if: 
? Your nose feels dry or bleeds when you use one of these machines. You may need to increase moisture in the air. A humidifier may help. ? Your nose is runny or stuffy from using a breathing machine. Decongestants or a corticosteroid nasal spray may help. ? You are sleepy during the day and it gets in the way of the normal things you do. Do not drive when you are drowsy. When should you call for help? Watch closely for changes in your health, and be sure to contact your doctor if:   · You still have sleep apnea even though you have made lifestyle changes.  
  · You are thinking of trying a device such as CPAP.  
  · You are having problems using a CPAP or similar machine. Where can you learn more? Go to http://marilin-desirae.info/. Enter F557 in the search box to learn more about \"Sleep Apnea: Care Instructions. \" Current as of: June 9, 2019 Content Version: 12.2 © 3708-5473 [email or web address]. Care instructions adapted under license by [employer] (which disclaims liability or warranty for this information). If you have questions about a medical condition or this instruction, always ask your healthcare professional. [address] any warranty or liability for your use of this information.

## 2020-01-27 NOTE — PROGRESS NOTES
Chief Complaint   Patient presents with    Hypertension    Depression     1. Have you been to the ER, urgent care clinic since your last visit? Hospitalized since your last visit? NO    2. Have you seen or consulted any other health care providers outside of the [address] [place] [address] since your last visit? Include any pap smears or colon screening.  NO

## 2020-01-28 LAB
ALB/GLOBRATIO, 58C: 1.6 (CALC) (ref 1–2.5)
ALBUMIN SERPL-MCNC: 4.6 G/DL (ref 3.6–5.1)
ALP SERPL-CCNC: 72 U/L (ref 40–115)
ALT SERPL-CCNC: 20 U/L (ref 9–46)
AST SERPL W P-5'-P-CCNC: 20 U/L (ref 10–40)
BILIRUB SERPL-MCNC: 0.4 MG/DL (ref 0.2–1.2)
BUN SERPL-MCNC: 16 MG/DL (ref 7–25)
BUN/CREATININE RATIO,BUCR: ABNORMAL (CALC) (ref 6–22)
CALCIUM SERPL-MCNC: 10.3 MG/DL (ref 8.6–10.3)
CHLORIDE SERPL-SCNC: 102 MMOL/L (ref 98–110)
CO2 SERPL-SCNC: 31 MMOL/L (ref 20–32)
CREAT SERPL-MCNC: 1.1 MG/DL (ref 0.6–1.35)
GLOBULIN,GLOB: 2.9 G/DL (CALC) (ref 1.9–3.7)
GLUCOSE SERPL-MCNC: 114 MG/DL (ref 65–99)
POTASSIUM SERPL-SCNC: 3.6 MMOL/L (ref 3.5–5.3)
PROT SERPL-MCNC: 7.5 G/DL (ref 6.1–8.1)
SODIUM SERPL-SCNC: 142 MMOL/L (ref 135–146)

## 2020-01-31 ENCOUNTER — APPOINTMENT (OUTPATIENT)
Dept: PHYSICAL THERAPY | Age: 45
End: 2020-01-31
Payer: MEDICAID

## 2020-01-31 NOTE — PROGRESS NOTES
[address]  [facility] PHYSICAL THERAPY  [address] [facility] [place], [address] - Phone: [phone]  Fax: [phone]  DISCHARGE SUMMARY FOR PHYSICAL THERAPY          Patient Name: Estrellita Pedroza III : 1975   Treatment/Medical Diagnosis: Gait instability [R26.81]   Referral Source: Ascencion Boyce* Start of Care [facility]): 2019   Prior Hospitalization: See Medical History Provider #: [id number]   Medications: Verified on Patient Summary List   Visits from [facility]: 7 Missed Visits: 7       Dear Dr. Kay Zimmerman under your direction, we have been providing physical therapy for your patient, for a diagnosis of Left Ankle Pain and Right Knee Pain     The patient attended 7 follow up visits and missed 7 visits. Pt was last seen on 2020 and did not return to PT. A progress note was completed at this session. Pt had been educated regarding our attendance policy. Due to the inability to further their care from non-attendance and insurance expiring, we are discharging the patient from physical therapy at this time. We appreciate the kind referral and would willingly work with this patient again, should they be able to arrange regular attendance. Your patient's health is our primary concern. Should you have any further questions or concerns, please feel free to contact me at your convenience. Sincerely,      Conrado Maynard DPT        Assessments/Recommendations: Discontinue therapy due to lack of attendance or compliance. If you have any questions/comments please contact us directly at [phone]. Thank you for allowing us to assist in the care of your patient. Therapist Signature:  Conrado Maynard DPT Date: 2020   Reporting Period: NA Time: 3:83 PM      Certification Period: NA       NOTE TO PHYSICIAN:  PLEASE COMPLETE THE ORDERS BELOW AND FAX TO   [facility]: [phone].   If you are unable to process this request in 24 hours please contact our office: [phone].    ___ I have read the above report and request that my patient be discharged from therapy.      Physician Signature:        Date:       Time:

## 2020-02-24 ENCOUNTER — OFFICE VISIT (OUTPATIENT)
Dept: ORTHOPEDIC SURGERY | Age: 45
End: 2020-02-24

## 2020-02-24 VITALS
BODY MASS INDEX: 42.83 KG/M2 | WEIGHT: 282.6 LBS | DIASTOLIC BLOOD PRESSURE: 99 MMHG | SYSTOLIC BLOOD PRESSURE: 138 MMHG | OXYGEN SATURATION: 98 % | HEART RATE: 97 BPM | HEIGHT: 68 IN | TEMPERATURE: 96.7 F

## 2020-02-24 DIAGNOSIS — M19.172 POST-TRAUMATIC OSTEOARTHRITIS OF LEFT ANKLE: Primary | ICD-10-CM

## 2020-02-24 RX ORDER — TRIAMCINOLONE ACETONIDE 40 MG/ML
40 INJECTION, SUSPENSION INTRA-ARTICULAR; INTRAMUSCULAR ONCE
Qty: 1 ML | Refills: 0
Start: 2020-02-24 | End: 2020-02-24
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
## 2020-02-27 ENCOUNTER — OFFICE VISIT (OUTPATIENT)
Dept: ORTHOPEDIC SURGERY | Facility: CLINIC | Age: 45
End: 2020-02-27

## 2020-02-27 VITALS
DIASTOLIC BLOOD PRESSURE: 88 MMHG | HEIGHT: 68 IN | OXYGEN SATURATION: 95 % | TEMPERATURE: 97 F | SYSTOLIC BLOOD PRESSURE: 125 MMHG | WEIGHT: 281.8 LBS | HEART RATE: 98 BPM | RESPIRATION RATE: 20 BRPM | BODY MASS INDEX: 42.71 KG/M2

## 2020-02-27 DIAGNOSIS — M25.561 CHRONIC PAIN OF RIGHT KNEE: ICD-10-CM

## 2020-02-27 DIAGNOSIS — M17.31 POST-TRAUMATIC OSTEOARTHRITIS OF RIGHT KNEE: Primary | ICD-10-CM

## 2020-02-27 DIAGNOSIS — G89.29 CHRONIC PAIN OF RIGHT KNEE: ICD-10-CM

## 2020-02-27 RX ORDER — BETAMETHASONE SODIUM PHOSPHATE AND BETAMETHASONE ACETATE 3; 3 MG/ML; MG/ML
6 INJECTION, SUSPENSION INTRA-ARTICULAR; INTRALESIONAL; INTRAMUSCULAR; SOFT TISSUE ONCE
Qty: 0.5 ML | Refills: 0
Start: 2020-02-27 | End: 2020-02-27
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
## 2020-02-27 NOTE — PROGRESS NOTES
Verbal order given by Dr. Kay Zarate to draw up 4 mL 0.25% Sensorcaine and 0.5 mL 30 mg/5mL Betamethasone.

## 2020-04-27 ENCOUNTER — VIRTUAL VISIT (OUTPATIENT)
Dept: FAMILY MEDICINE CLINIC | Age: 45
End: 2020-04-27

## 2020-04-27 DIAGNOSIS — N52.2 DRUG-INDUCED ERECTILE DYSFUNCTION: ICD-10-CM

## 2020-04-27 DIAGNOSIS — R53.82 CHRONIC FATIGUE: ICD-10-CM

## 2020-04-27 DIAGNOSIS — I10 ESSENTIAL HYPERTENSION: Primary | ICD-10-CM

## 2020-04-27 RX ORDER — SILDENAFIL 50 MG/1
50 TABLET, FILM COATED ORAL AS NEEDED
Qty: 30 TAB | Refills: 0 | Status: SHIPPED | OUTPATIENT
Start: 2020-04-27
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
## 2020-04-27 NOTE — PROGRESS NOTES
1. Have you been to the ER, urgent care clinic since your last visit? Hospitalized since your last visit? No.    2. Have you seen or consulted any other health care providers outside of the [address] [place] [address] since your last visit? Include any pap smears or colon screening.  No    Chief Complaint   Patient presents with    Complete Physical

## 2020-04-27 NOTE — PATIENT INSTRUCTIONS
Call your insurance and find out where you can go for pain management.  
 
  
 Sleep Studies You have been referred to: 
Soraida Martinez [address] [facility] [address] [address] [place] [address] Phone: [phone] Fax: [phone] Urology for erectile dysfunction and low testosterone check You have been referred to: 
Britni Aguilar 
[address] [facility] Phone: [phone] Fax: [phone]

## 2020-05-19 ENCOUNTER — TELEPHONE (OUTPATIENT)
Dept: ORTHOPEDIC SURGERY | Age: 45
End: 2020-05-19

## 2020-05-19 NOTE — TELEPHONE ENCOUNTER
Patient called stating that he needs his pain management referral sent to [facility]. Please advise patient at [phone] when completed.

## 2020-05-21 DIAGNOSIS — M17.31 POST-TRAUMATIC OSTEOARTHRITIS OF RIGHT KNEE: Primary | ICD-10-CM

## 2020-05-21 DIAGNOSIS — M17.11 PRIMARY OSTEOARTHRITIS OF RIGHT KNEE: ICD-10-CM

## 2020-05-21 DIAGNOSIS — M25.561 CHRONIC PAIN OF RIGHT KNEE: ICD-10-CM

## 2020-05-21 DIAGNOSIS — G89.29 CHRONIC PAIN OF RIGHT KNEE: ICD-10-CM

## 2020-06-12 DIAGNOSIS — I10 ESSENTIAL HYPERTENSION: ICD-10-CM

## 2020-06-12 RX ORDER — CHLORTHALIDONE 25 MG/1
TABLET ORAL
Qty: 90 TAB | Refills: 0 | Status: SHIPPED | OUTPATIENT
Start: 2020-06-12 | End: 2020-10-23

## 2020-06-16 ENCOUNTER — TELEPHONE (OUTPATIENT)
Dept: ORTHOPEDIC SURGERY | Facility: CLINIC | Age: 45
End: 2020-06-16

## 2020-06-16 NOTE — TELEPHONE ENCOUNTER
Patient called stating that he has not heard anything from [facility] pain management for 3 weeks and has tried to call back to [facility] but he has no idea who he is seeing and wants to know if our office knows which doctor he is seeing. I did advise him we do not have that information anf he would have to talk to [facility], he stated they never answer the phone and he needs to know which doctor he is seeing.  He asked to talk to clinical please advise patient at [phone]

## 2020-06-16 NOTE — TELEPHONE ENCOUNTER
Spoke with patient states he received call from Pain Management office already and would like to know phone number to return call and schedule appt. Provided patient with office number [facility] [phone]. No further questions or concerns.

## 2020-06-18 ENCOUNTER — DOCUMENTATION ONLY (OUTPATIENT)
Dept: ORTHOPEDIC SURGERY | Facility: CLINIC | Age: 45
End: 2020-06-18

## 2020-06-18 NOTE — TELEPHONE ENCOUNTER
Spoke with pt--he wants us to re send notes to [facility] --given to Marisue Denver

## 2020-06-18 NOTE — TELEPHONE ENCOUNTER
Pt call again for status update of his pain mgmnt referral-- he's unable to reach anyone at [facility] and he's been w/out any medication for 3 months. States he was actually speaking with someone then his phone  and now they're unreachable. Please advise pt. .. call him at p#[phone]

## 2020-07-29 ENCOUNTER — TELEPHONE (OUTPATIENT)
Dept: ORTHOPEDIC SURGERY | Age: 45
End: 2020-07-29

## 2020-07-29 NOTE — TELEPHONE ENCOUNTER
Patient is requesting we contact [facility] regarding his medications. He doesn't feel that he's being treated fairly by them and that they're withholding his pain medications. He states he needs his medication and is prepping for surgery. Patient says it's urgent we contact Dr. Katerine Aranda at [facility] to straighten this out.

## 2020-07-30 NOTE — TELEPHONE ENCOUNTER
We cannot intervene with [facility] PM treatment plan or protocol.     Reny Randle PA-C  7/30/2020  10:16 AM

## 2020-08-03 ENCOUNTER — OFFICE VISIT (OUTPATIENT)
Dept: ORTHOPEDIC SURGERY | Facility: CLINIC | Age: 45
End: 2020-08-03

## 2020-08-03 VITALS
BODY MASS INDEX: 43.19 KG/M2 | SYSTOLIC BLOOD PRESSURE: 141 MMHG | RESPIRATION RATE: 15 BRPM | DIASTOLIC BLOOD PRESSURE: 101 MMHG | WEIGHT: 285 LBS | HEART RATE: 114 BPM | HEIGHT: 68 IN | TEMPERATURE: 97.4 F

## 2020-08-03 DIAGNOSIS — M17.11 PRIMARY OSTEOARTHRITIS OF RIGHT KNEE: Primary | ICD-10-CM

## 2020-08-03 DIAGNOSIS — G89.29 CHRONIC LEFT SHOULDER PAIN: ICD-10-CM

## 2020-08-03 DIAGNOSIS — M25.512 CHRONIC LEFT SHOULDER PAIN: ICD-10-CM

## 2020-08-03 DIAGNOSIS — M75.22 BICEPS TENDINITIS OF LEFT UPPER EXTREMITY: ICD-10-CM

## 2020-08-03 DIAGNOSIS — M25.561 CHRONIC PAIN OF RIGHT KNEE: ICD-10-CM

## 2020-08-03 DIAGNOSIS — M75.52 CHRONIC BURSITIS OF LEFT SHOULDER: ICD-10-CM

## 2020-08-03 DIAGNOSIS — G89.29 CHRONIC PAIN OF RIGHT KNEE: ICD-10-CM

## 2020-08-03 RX ORDER — BETAMETHASONE SODIUM PHOSPHATE AND BETAMETHASONE ACETATE 3; 3 MG/ML; MG/ML
6 INJECTION, SUSPENSION INTRA-ARTICULAR; INTRALESIONAL; INTRAMUSCULAR; SOFT TISSUE ONCE
Qty: 0.5 ML | Refills: 0
Start: 2020-08-03 | End: 2020-08-03
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
## 2020-12-28 ENCOUNTER — TELEPHONE (OUTPATIENT)
Dept: ORTHOPEDIC SURGERY | Age: 45
End: 2020-12-28

## 2020-12-28 NOTE — TELEPHONE ENCOUNTER
Patient called stating he found 2 pain management doctors that do accept his insurance and he just needs Dr. Joana Bernheim to call them and request an appointment for him. Dr. Yoly Matos [address], [address], [facility] [address]  [phone]    Dr. Tiffanie Oleary,  [address] [facility], [facility]  [phone]    He states he has been gaining weight because he can't move too well due to the pain so he needs to get into a pain management facility as soon as possible. He's requesting a call back once we've contacted these offices at [phone].

## 2020-12-28 NOTE — TELEPHONE ENCOUNTER
Patients referral, demographics and office notes [facility] (Dr. Regla Van) as requested by patient.    Tel# [phone]   Fax# [phone]

## 2021-01-04 ENCOUNTER — OFFICE VISIT (OUTPATIENT)
Dept: ORTHOPEDIC SURGERY | Age: 46
End: 2021-01-04
Payer: MEDICAID

## 2021-01-04 VITALS
HEART RATE: 98 BPM | TEMPERATURE: 97.2 F | BODY MASS INDEX: 43.59 KG/M2 | RESPIRATION RATE: 16 BRPM | HEIGHT: 68 IN | OXYGEN SATURATION: 94 % | WEIGHT: 287.6 LBS | SYSTOLIC BLOOD PRESSURE: 155 MMHG | DIASTOLIC BLOOD PRESSURE: 93 MMHG

## 2021-01-04 DIAGNOSIS — M25.561 CHRONIC PAIN OF RIGHT KNEE: ICD-10-CM

## 2021-01-04 DIAGNOSIS — M25.512 CHRONIC LEFT SHOULDER PAIN: ICD-10-CM

## 2021-01-04 DIAGNOSIS — G89.29 CHRONIC LEFT SHOULDER PAIN: ICD-10-CM

## 2021-01-04 DIAGNOSIS — M17.11 PRIMARY OSTEOARTHRITIS OF RIGHT KNEE: Primary | ICD-10-CM

## 2021-01-04 DIAGNOSIS — G89.29 CHRONIC PAIN OF RIGHT KNEE: ICD-10-CM

## 2021-01-04 DIAGNOSIS — M75.52 CHRONIC BURSITIS OF LEFT SHOULDER: ICD-10-CM

## 2021-01-04 PROCEDURE — 20610 DRAIN/INJ JOINT/BURSA W/O US: CPT | Performed by: SPECIALIST

## 2021-01-04 PROCEDURE — 99213 OFFICE O/P EST LOW 20 MIN: CPT | Performed by: SPECIALIST

## 2021-01-04 RX ORDER — BETAMETHASONE SODIUM PHOSPHATE AND BETAMETHASONE ACETATE 3; 3 MG/ML; MG/ML
3 INJECTION, SUSPENSION INTRA-ARTICULAR; INTRALESIONAL; INTRAMUSCULAR; SOFT TISSUE ONCE
Status: COMPLETED | OUTPATIENT
Start: 2021-01-04 | End: 2021-01-04

## 2021-01-04 RX ADMIN — BETAMETHASONE SODIUM PHOSPHATE AND BETAMETHASONE ACETATE 3 MG: 3; 3 INJECTION, SUSPENSION INTRA-ARTICULAR; INTRALESIONAL; INTRAMUSCULAR; SOFT TISSUE at 10:34

## 2021-01-04 RX ADMIN — BETAMETHASONE SODIUM PHOSPHATE AND BETAMETHASONE ACETATE 3 MG: 3; 3 INJECTION, SUSPENSION INTRA-ARTICULAR; INTRALESIONAL; INTRAMUSCULAR; SOFT TISSUE at 10:33
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
## 2021-01-05 ENCOUNTER — TELEPHONE (OUTPATIENT)
Dept: ORTHOPEDIC SURGERY | Age: 46
End: 2021-01-05

## 2021-01-05 NOTE — TELEPHONE ENCOUNTER
Patient called stating that he has found a pain management office that he can be seen at as Dr. Ai Yusuf on [address] [facility]. He did give me the phone number as [phone] and the fax number as [phone].

## 2021-01-07 ENCOUNTER — TELEPHONE (OUTPATIENT)
Dept: ORTHOPEDIC SURGERY | Age: 46
End: 2021-01-07

## 2021-01-07 NOTE — TELEPHONE ENCOUNTER
Patient is requesting we resend referral to the below pain management physician.      Christie Comer MD  [address]  Phone [phone]  Fax [phone]    Patient [phone]

## 2021-01-07 NOTE — TELEPHONE ENCOUNTER
Spoke with patient and informed him that his info has been sent to the pain management provider that he requested. Asked him to wait until early next week for a phone call from that office. He will call back if he doesn't hear from them.

## 2021-01-12 ENCOUNTER — OFFICE VISIT (OUTPATIENT)
Dept: ORTHOPEDIC SURGERY | Age: 46
End: 2021-01-12
Payer: MEDICAID

## 2021-01-12 VITALS
WEIGHT: 289 LBS | HEART RATE: 96 BPM | TEMPERATURE: 96.8 F | RESPIRATION RATE: 16 BRPM | DIASTOLIC BLOOD PRESSURE: 97 MMHG | BODY MASS INDEX: 43.8 KG/M2 | HEIGHT: 68 IN | SYSTOLIC BLOOD PRESSURE: 145 MMHG | OXYGEN SATURATION: 96 %

## 2021-01-12 DIAGNOSIS — M19.071 PRIMARY OSTEOARTHRITIS OF RIGHT FOOT: ICD-10-CM

## 2021-01-12 DIAGNOSIS — M19.072 PRIMARY OSTEOARTHRITIS OF LEFT ANKLE: Primary | ICD-10-CM

## 2021-01-12 PROCEDURE — 73630 X-RAY EXAM OF FOOT: CPT | Performed by: ORTHOPAEDIC SURGERY

## 2021-01-12 PROCEDURE — 99213 OFFICE O/P EST LOW 20 MIN: CPT | Performed by: ORTHOPAEDIC SURGERY

## 2021-01-12 PROCEDURE — 20605 DRAIN/INJ JOINT/BURSA W/O US: CPT | Performed by: ORTHOPAEDIC SURGERY

## 2021-01-12 PROCEDURE — 73600 X-RAY EXAM OF ANKLE: CPT | Performed by: ORTHOPAEDIC SURGERY

## 2021-01-12 RX ORDER — TRIAMCINOLONE ACETONIDE 40 MG/ML
40 INJECTION, SUSPENSION INTRA-ARTICULAR; INTRAMUSCULAR ONCE
Status: COMPLETED | OUTPATIENT
Start: 2021-01-12 | End: 2021-01-12

## 2021-01-12 RX ADMIN — TRIAMCINOLONE ACETONIDE 40 MG: 40 INJECTION, SUSPENSION INTRA-ARTICULAR; INTRAMUSCULAR at 12:13
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
## 2021-01-14 ENCOUNTER — TELEPHONE (OUTPATIENT)
Dept: ORTHOPEDIC SURGERY | Age: 46
End: 2021-01-14

## 2021-01-14 ENCOUNTER — VIRTUAL VISIT (OUTPATIENT)
Dept: FAMILY MEDICINE CLINIC | Age: 46
End: 2021-01-14
Payer: MEDICAID

## 2021-01-14 DIAGNOSIS — R35.1 FREQUENT URINATION AT NIGHT: ICD-10-CM

## 2021-01-14 DIAGNOSIS — I10 ESSENTIAL HYPERTENSION: ICD-10-CM

## 2021-01-14 DIAGNOSIS — G47.19 EXCESSIVE DAYTIME SLEEPINESS: ICD-10-CM

## 2021-01-14 DIAGNOSIS — R53.82 CHRONIC FATIGUE: Primary | ICD-10-CM

## 2021-01-14 PROCEDURE — 99214 OFFICE O/P EST MOD 30 MIN: CPT | Performed by: NURSE PRACTITIONER

## 2021-01-14 RX ORDER — AMLODIPINE BESYLATE 5 MG/1
5 TABLET ORAL DAILY
Qty: 90 TAB | Refills: 0 | Status: SHIPPED | OUTPATIENT
Start: 2021-01-14 | End: 2021-03-19 | Stop reason: SDUPTHER

## 2021-01-14 RX ORDER — CHLORTHALIDONE 25 MG/1
TABLET ORAL
Qty: 90 TAB | Refills: 0 | Status: SHIPPED | OUTPATIENT
Start: 2021-01-14 | End: 2021-03-19 | Stop reason: SDUPTHER

## 2021-01-14 RX ORDER — QUETIAPINE FUMARATE 50 MG/1
TABLET, EXTENDED RELEASE ORAL
COMMUNITY
Start: 2020-12-30

## 2021-01-14 NOTE — TELEPHONE ENCOUNTER
Patient called with pain management information below to send his referral and notes to.      Dr. Marino 99  [address][id number]  [facility] [place] [address]  Phone [phone]  Fax [phone]

## 2021-01-14 NOTE — PROGRESS NOTES
Chief Complaint   Patient presents with    Weight Management     290- Wants sleep study    Hypertension     Moved to [facility] - states will continue care if continue only with Virtual    Foot Pain           1. Have you been to the ER, urgent care clinic since your last visit? Hospitalized since your last visit? No    2. Have you seen or consulted any other health care providers outside of the [address] [place] [address] since your last visit? Include any pap smears or colon screening.  Pain mgmt-Dr. Jada Gregory, aniyah  Fax [phone] urology, ortho
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
## 2021-01-14 NOTE — TELEPHONE ENCOUNTER
Patient is requesting we now send referral and notes to the below.      Dr. Jolanda Phalen  [facility]  Phone [phone]

## 2021-01-14 NOTE — TELEPHONE ENCOUNTER
Richard Childress faxed records to this office on 01/06/21 and received a fax back on 01/12/21 that patient was not going to be accepted by Dr. Stephanie Cortes.

## 2021-01-14 NOTE — TELEPHONE ENCOUNTER
It was faxed on 12/28/20 per PM referral notes, I will refax it.    Type Date User Summary Attachment   General 12/28/2020  3:16 PM Tari Honeycutt - -   Note    Patients records and referral were faxed to [facility] (Dr. Tarah Lockhart) as requested by patient tel# [phone] fax# [phone]

## 2021-03-02 ENCOUNTER — TELEPHONE (OUTPATIENT)
Dept: ORTHOPEDIC SURGERY | Age: 46
End: 2021-03-02

## 2021-03-02 DIAGNOSIS — M19.072 PRIMARY OSTEOARTHRITIS OF LEFT ANKLE: Primary | ICD-10-CM

## 2021-03-02 DIAGNOSIS — M19.071 PRIMARY OSTEOARTHRITIS OF RIGHT FOOT: ICD-10-CM

## 2021-03-02 DIAGNOSIS — M19.172 POST-TRAUMATIC OSTEOARTHRITIS OF LEFT FOOT: ICD-10-CM

## 2021-03-02 NOTE — TELEPHONE ENCOUNTER
Jacey to provide order for PM as requested. Maria Ines Valentin Host [facility], PADMINI, PAAnirudhC  3/2/2021   10:32 AM

## 2021-03-02 NOTE — TELEPHONE ENCOUNTER
Patient is requesting a referral to [facility] pain management. He requested I message both of his providers regarding this.      Patient  [phone]

## 2021-03-03 NOTE — TELEPHONE ENCOUNTER
Received a fax back from [facility]- states that after careful review patient is not a candidate for services at [facility] And all records have been destroyed.

## 2021-03-19 ENCOUNTER — VIRTUAL VISIT (OUTPATIENT)
Dept: FAMILY MEDICINE CLINIC | Age: 46
End: 2021-03-19
Payer: MEDICAID

## 2021-03-19 DIAGNOSIS — R53.82 CHRONIC FATIGUE: ICD-10-CM

## 2021-03-19 DIAGNOSIS — I10 ESSENTIAL HYPERTENSION: Primary | ICD-10-CM

## 2021-03-19 DIAGNOSIS — E66.01 SEVERE OBESITY (HCC): ICD-10-CM

## 2021-03-19 PROCEDURE — 99214 OFFICE O/P EST MOD 30 MIN: CPT | Performed by: NURSE PRACTITIONER

## 2021-03-19 RX ORDER — AMLODIPINE BESYLATE 5 MG/1
5 TABLET ORAL DAILY
Qty: 90 TAB | Refills: 0 | Status: SHIPPED | OUTPATIENT
Start: 2021-03-19

## 2021-03-19 RX ORDER — CHLORTHALIDONE 25 MG/1
TABLET ORAL
Qty: 90 TAB | Refills: 0 | Status: SHIPPED | OUTPATIENT
Start: 2021-03-19

## 2021-03-19 NOTE — PROGRESS NOTES
Chief Complaint   Patient presents with    Hypertension     1. Have you been to the ER, urgent care clinic since your last visit? Hospitalized since your last visit? No    2. Have you seen or consulted any other health care providers outside of the [address] [place] [address] since your last visit? Include any pap smears or colon screening. Pulmonary, sports medicine     Chief Complaint   Patient presents with    Hypertension       3 most recent PHQ Screens 3/19/2021   PHQ Not Done -   Little interest or pleasure in doing things Not at all   Feeling down, depressed, irritable, or hopeless Not at all   Total Score PHQ 2 0   Trouble falling or staying asleep, or sleeping too much -   Feeling tired or having little energy -   Poor appetite, weight loss, or overeating -   Feeling bad about yourself - or that you are a failure or have let yourself or your family down -   Trouble concentrating on things such as school, work, reading, or watching TV -   Moving or speaking so slowly that other people could have noticed; or the opposite being so fidgety that others notice -   Thoughts of being better off dead, or hurting yourself in some way -   PHQ 9 Score -   How difficult have these problems made it for you to do your work, take care of your home and get along with others -     Fall Risk Assessment, last 12 mths 1/14/2021   Able to walk? Yes   Fall in past 12 months? 0   Do you feel unsteady? 0   Are you worried about falling 0               Learning Assessment 12/24/2019   PRIMARY LEARNER Patient   HIGHEST LEVEL OF EDUCATION - PRIMARY LEARNER  -   BARRIERS PRIMARY LEARNER -   CO-LEARNER CAREGIVER -   PRIMARY LANGUAGE ENGLISH   LEARNER PREFERENCE PRIMARY PICTURES   ANSWERED BY patient   RELATIONSHIP SELF     There were no vitals taken for this visit.
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
## 2021-03-19 NOTE — PATIENT INSTRUCTIONS
Nella Gabriel Physician [facility] NEDRA KEN, [address], [address] [facility] Urology Nella Gabriel Physician Provider Summary 
  
Sex Title Provider type Unknown MD Physician Primary Contact Information 
  
Phone Fax E-mail Address [phone] Not available Ele Salazar [id number]

## 2021-03-29 PROBLEM — G47.33 OSA (OBSTRUCTIVE SLEEP APNEA): Status: ACTIVE | Noted: 2021-03-29

## 2021-08-25 ENCOUNTER — OFFICE VISIT (OUTPATIENT)
Dept: ORTHOPEDIC SURGERY | Age: 46
End: 2021-08-25
Payer: MEDICARE

## 2021-08-25 ENCOUNTER — TELEPHONE (OUTPATIENT)
Dept: ORTHOPEDIC SURGERY | Age: 46
End: 2021-08-25

## 2021-08-25 VITALS
OXYGEN SATURATION: 99 % | HEART RATE: 96 BPM | BODY MASS INDEX: 44.41 KG/M2 | TEMPERATURE: 96.9 F | WEIGHT: 293 LBS | HEIGHT: 68 IN

## 2021-08-25 DIAGNOSIS — G89.29 CHRONIC PAIN OF RIGHT KNEE: ICD-10-CM

## 2021-08-25 DIAGNOSIS — M54.16 LUMBAR RADICULOPATHY: ICD-10-CM

## 2021-08-25 DIAGNOSIS — M25.561 CHRONIC PAIN OF RIGHT KNEE: ICD-10-CM

## 2021-08-25 DIAGNOSIS — M17.11 PRIMARY OSTEOARTHRITIS OF RIGHT KNEE: Primary | ICD-10-CM

## 2021-08-25 DIAGNOSIS — M54.50 LUMBAR PAIN: ICD-10-CM

## 2021-08-25 PROCEDURE — 99213 OFFICE O/P EST LOW 20 MIN: CPT | Performed by: SPECIALIST

## 2021-08-25 PROCEDURE — 20552 NJX 1/MLT TRIGGER POINT 1/2: CPT | Performed by: SPECIALIST

## 2021-08-25 PROCEDURE — G9717 DOC PT DX DEP/BP F/U NT REQ: HCPCS | Performed by: SPECIALIST

## 2021-08-25 PROCEDURE — G8427 DOCREV CUR MEDS BY ELIG CLIN: HCPCS | Performed by: SPECIALIST

## 2021-08-25 PROCEDURE — 20610 DRAIN/INJ JOINT/BURSA W/O US: CPT | Performed by: SPECIALIST

## 2021-08-25 PROCEDURE — G8756 NO BP MEASURE DOC: HCPCS | Performed by: SPECIALIST

## 2021-08-25 PROCEDURE — G8417 CALC BMI ABV UP PARAM F/U: HCPCS | Performed by: SPECIALIST

## 2021-08-25 RX ORDER — BETAMETHASONE SODIUM PHOSPHATE AND BETAMETHASONE ACETATE 3; 3 MG/ML; MG/ML
3 INJECTION, SUSPENSION INTRA-ARTICULAR; INTRALESIONAL; INTRAMUSCULAR; SOFT TISSUE ONCE
Status: COMPLETED | OUTPATIENT
Start: 2021-08-25 | End: 2021-08-25

## 2021-08-25 RX ADMIN — BETAMETHASONE SODIUM PHOSPHATE AND BETAMETHASONE ACETATE 3 MG: 3; 3 INJECTION, SUSPENSION INTRA-ARTICULAR; INTRALESIONAL; INTRAMUSCULAR; SOFT TISSUE at 10:11

## 2021-08-25 RX ADMIN — BETAMETHASONE SODIUM PHOSPHATE AND BETAMETHASONE ACETATE 3 MG: 3; 3 INJECTION, SUSPENSION INTRA-ARTICULAR; INTRALESIONAL; INTRAMUSCULAR; SOFT TISSUE at 10:12
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
## 2021-08-25 NOTE — TELEPHONE ENCOUNTER
Pt has [facility]- their preferred drug is Orthovisc or Synvisc. Can a new order be placed?

## 2021-10-28 ENCOUNTER — TELEPHONE (OUTPATIENT)
Dept: SPORTS MEDICINE | Age: 46
End: 2021-10-28

## 2021-10-28 NOTE — TELEPHONE ENCOUNTER
Jacey to provide order for PM as requested. Maria Ines Carpio, [facility], MPA, PA-C  10/28/2021   2:10 PM

## 2021-10-28 NOTE — TELEPHONE ENCOUNTER
Faxed referral to given number. Confirmation    Attempted to call the patient but the phone number on file is not in service.

## 2021-10-28 NOTE — TELEPHONE ENCOUNTER
Patient called to request pain management referral be sent to Dr. Caitie Boone at [facility].. Patient provided their fax #, [phone]. Original referral has been closed out.

## 2021-11-03 ENCOUNTER — TELEPHONE (OUTPATIENT)
Dept: ORTHOPEDIC SURGERY | Age: 46
End: 2021-11-03

## 2021-11-03 NOTE — TELEPHONE ENCOUNTER
Patient called again regarding the message thread from 10/28/21. He says [employer] are saying they haven't received the pain management referral from us yet. He's asking if we're able to call this office at phone number [phone] to verify if we have the right fax number. Please advise patient back when completed at [phone].

## 2021-11-04 NOTE — TELEPHONE ENCOUNTER
I have re faxed records to [facility] 5 PM. The fax number is correct, we are also having issue getting other patients information over there.

## 2022-01-05 ENCOUNTER — TELEPHONE (OUTPATIENT)
Dept: ORTHOPEDIC SURGERY | Age: 47
End: 2022-01-05

## 2022-01-05 DIAGNOSIS — G89.29 CHRONIC PAIN OF RIGHT KNEE: ICD-10-CM

## 2022-01-05 DIAGNOSIS — G89.29 CHRONIC LEFT SHOULDER PAIN: ICD-10-CM

## 2022-01-05 DIAGNOSIS — M25.512 CHRONIC LEFT SHOULDER PAIN: ICD-10-CM

## 2022-01-05 DIAGNOSIS — M17.11 PRIMARY OSTEOARTHRITIS OF RIGHT KNEE: Primary | ICD-10-CM

## 2022-01-05 DIAGNOSIS — M25.561 CHRONIC PAIN OF RIGHT KNEE: ICD-10-CM

## 2022-01-05 DIAGNOSIS — M54.50 LUMBAR PAIN: ICD-10-CM

## 2022-01-05 NOTE — TELEPHONE ENCOUNTER
Patients referral, demographics and office note were faxed as requested. Confirmation sent to scanning.

## 2022-01-05 NOTE — TELEPHONE ENCOUNTER
Patient is requesting a referral to pain management and his notes be sent as well to the below.       Helga Ramos M.D.  [address] [place] [address],[address] [facility], [address] [place] [address]  Main Phone: [phone]  Fax: [phone]    Patient [phone]

## 2022-03-02 ENCOUNTER — OFFICE VISIT (OUTPATIENT)
Dept: ORTHOPEDIC SURGERY | Age: 47
End: 2022-03-02
Payer: MEDICARE

## 2022-03-02 VITALS — OXYGEN SATURATION: 98 % | HEART RATE: 85 BPM | BODY MASS INDEX: 44.41 KG/M2 | WEIGHT: 293 LBS | HEIGHT: 68 IN

## 2022-03-02 DIAGNOSIS — M17.11 PRIMARY OSTEOARTHRITIS OF RIGHT KNEE: ICD-10-CM

## 2022-03-02 DIAGNOSIS — G89.29 CHRONIC PAIN OF RIGHT KNEE: ICD-10-CM

## 2022-03-02 DIAGNOSIS — G89.29 CHRONIC PAIN OF LEFT KNEE: ICD-10-CM

## 2022-03-02 DIAGNOSIS — M25.561 CHRONIC PAIN OF RIGHT KNEE: ICD-10-CM

## 2022-03-02 DIAGNOSIS — M25.562 CHRONIC PAIN OF LEFT KNEE: ICD-10-CM

## 2022-03-02 DIAGNOSIS — M17.12 PRIMARY OSTEOARTHRITIS OF LEFT KNEE: Primary | ICD-10-CM

## 2022-03-02 PROCEDURE — 99213 OFFICE O/P EST LOW 20 MIN: CPT | Performed by: SPECIALIST

## 2022-03-02 PROCEDURE — G8427 DOCREV CUR MEDS BY ELIG CLIN: HCPCS | Performed by: SPECIALIST

## 2022-03-02 PROCEDURE — 20610 DRAIN/INJ JOINT/BURSA W/O US: CPT | Performed by: SPECIALIST

## 2022-03-02 PROCEDURE — G9717 DOC PT DX DEP/BP F/U NT REQ: HCPCS | Performed by: SPECIALIST

## 2022-03-02 PROCEDURE — G8756 NO BP MEASURE DOC: HCPCS | Performed by: SPECIALIST

## 2022-03-02 PROCEDURE — G8417 CALC BMI ABV UP PARAM F/U: HCPCS | Performed by: SPECIALIST

## 2022-03-02 RX ORDER — BETAMETHASONE SODIUM PHOSPHATE AND BETAMETHASONE ACETATE 3; 3 MG/ML; MG/ML
6 INJECTION, SUSPENSION INTRA-ARTICULAR; INTRALESIONAL; INTRAMUSCULAR; SOFT TISSUE ONCE
Status: COMPLETED | OUTPATIENT
Start: 2022-03-02 | End: 2022-03-02

## 2022-03-02 RX ADMIN — BETAMETHASONE SODIUM PHOSPHATE AND BETAMETHASONE ACETATE 6 MG: 3; 3 INJECTION, SUSPENSION INTRA-ARTICULAR; INTRALESIONAL; INTRAMUSCULAR; SOFT TISSUE at 11:32
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
## 2022-03-18 PROBLEM — F33.1 MODERATE EPISODE OF RECURRENT MAJOR DEPRESSIVE DISORDER (HCC): Status: ACTIVE | Noted: 2019-09-08

## 2022-03-18 PROBLEM — M25.512 CHRONIC LEFT SHOULDER PAIN: Status: ACTIVE | Noted: 2019-05-17

## 2022-03-18 PROBLEM — G89.29 CHRONIC LEFT SHOULDER PAIN: Status: ACTIVE | Noted: 2019-05-17

## 2022-03-19 PROBLEM — Q66.6 CONGENITAL PES PLANOVALGUS: Status: ACTIVE | Noted: 2019-05-19

## 2022-03-19 PROBLEM — M54.50 CHRONIC MIDLINE LOW BACK PAIN WITHOUT SCIATICA: Status: ACTIVE | Noted: 2019-05-17

## 2022-03-19 PROBLEM — I10 ESSENTIAL HYPERTENSION: Status: ACTIVE | Noted: 2019-05-19

## 2022-03-19 PROBLEM — E66.01 SEVERE OBESITY (HCC): Status: ACTIVE | Noted: 2019-04-19
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
## 2022-03-19 PROBLEM — G47.33 OSA (OBSTRUCTIVE SLEEP APNEA): Status: ACTIVE | Noted: 2021-03-29

## 2022-03-19 PROBLEM — Z98.890 H/O RIGHT KNEE SURGERY: Status: ACTIVE | Noted: 2019-05-19

## 2022-03-19 PROBLEM — N52.2 DRUG-INDUCED ERECTILE DYSFUNCTION: Status: ACTIVE | Noted: 2019-09-08

## 2022-03-19 PROBLEM — G89.29 CHRONIC MIDLINE LOW BACK PAIN WITHOUT SCIATICA: Status: ACTIVE | Noted: 2019-05-17

## 2022-03-19 PROBLEM — M79.672 BILATERAL FOOT PAIN: Status: ACTIVE | Noted: 2019-07-05

## 2022-03-19 PROBLEM — M79.671 BILATERAL FOOT PAIN: Status: ACTIVE | Noted: 2019-07-05

## 2022-03-20 PROBLEM — R53.82 CHRONIC FATIGUE: Status: ACTIVE | Noted: 2020-01-27

## 2022-03-20 PROBLEM — F10.10 ALCOHOL ABUSE: Status: ACTIVE | Noted: 2019-07-05

## 2022-03-23 ENCOUNTER — TELEPHONE (OUTPATIENT)
Dept: ORTHOPEDIC SURGERY | Age: 47
End: 2022-03-23

## 2022-03-23 NOTE — TELEPHONE ENCOUNTER
Patient called stating that he needs a letter describing what is going on with his feet and knees.  Please advise patient at [phone]

## 2022-03-23 NOTE — LETTER
4/20/2022 3:08 PM    Trina Alarcon [facility] is currently under the care of [address] [place] [address].     Mr. Francine Ray has severe osteoarthritis of the left hindfoot and ankle and of the right knee. His treatment options include custom bracing, such as a custom [place] or fixed AFO brace, which he has already tried, or a hindfoot fusion for his left hindfoot. This will leave him with permanently restricted motion of his left hindfoot, which will limit his ability to work. He is a candidate for a right total knee replacement due to severe osteoarthritis.  It is my opinion that he is disabled from working his current job.      If there are questions or concerns please have the patient contact our office.           Sincerely,      Janeth Nicole MD

## 2022-04-20 ENCOUNTER — DOCUMENTATION ONLY (OUTPATIENT)
Dept: ORTHOPEDIC SURGERY | Age: 47
End: 2022-04-20

## 2022-04-20 NOTE — TELEPHONE ENCOUNTER
Patient called stating that he came to the [facility] office to sign a release form for his diagnosis of his foot/ankle and knee but he does not need office notes he needs a letter like he received in the past. The last note he received was on 8/28/2019 and he really just needs that note updated for this year. He asked if this can be done and mailed to his address on file.  Please advise patient at [phone]

## 2022-04-20 NOTE — TELEPHONE ENCOUNTER
Discussed with Dr. Jake Samaniego. Per Dr. Jake Samaniego, it is ok to renew same letter from 8-. Letter written and medical records notified.

## 2023-03-23 ENCOUNTER — TELEPHONE (OUTPATIENT)
Age: 48
End: 2023-03-23

## 2023-05-05 NOTE — TELEPHONE ENCOUNTER
Patient called and requested for his records to be sent to [facility] in [place], [place]. The fax number is [phone]. Also, he requested for his records to go to [facility] and stated that the doctors first name was Misael De Luna but he doesn't know the last name. The fax number is [phone] and the phone number is [phone]. Patient can be reached at [phone].

## 2023-05-05 NOTE — TELEPHONE ENCOUNTER
Spoke with patient and informed him he would have to come in and sign a medical release form in order for us to send his records to his preferred Provider.